# Patient Record
Sex: FEMALE | Race: WHITE | NOT HISPANIC OR LATINO | Employment: OTHER | URBAN - METROPOLITAN AREA
[De-identification: names, ages, dates, MRNs, and addresses within clinical notes are randomized per-mention and may not be internally consistent; named-entity substitution may affect disease eponyms.]

---

## 2017-02-15 ENCOUNTER — HISTORICAL (OUTPATIENT)
Dept: ADMINISTRATIVE | Facility: HOSPITAL | Age: 52
End: 2017-02-15

## 2017-05-16 ENCOUNTER — HISTORICAL (OUTPATIENT)
Dept: ADMINISTRATIVE | Facility: HOSPITAL | Age: 52
End: 2017-05-16

## 2017-05-16 LAB
ERYTHROCYTE [SEDIMENTATION RATE] IN BLOOD: 8 MM/HR (ref 0–20)
RHEUMATOID FACT SERPL-ACNC: <10 IU/ML (ref 0–15)
VIT B12 SERPL-MCNC: 335 PG/ML (ref 180–914)

## 2017-08-15 ENCOUNTER — HISTORICAL (OUTPATIENT)
Dept: INTERNAL MEDICINE | Facility: CLINIC | Age: 52
End: 2017-08-15

## 2017-08-15 LAB
EST. AVERAGE GLUCOSE BLD GHB EST-MCNC: 117 MG/DL
HBA1C MFR BLD: 5.7 % (ref 4.2–6.3)
HIV 1+2 AB+HIV1 P24 AG SERPL QL IA: NONREACTIVE
IRON SERPL-MCNC: 117 MCG/DL (ref 50–170)
VIT B12 SERPL-MCNC: 614 PG/ML (ref 193–986)

## 2017-09-19 ENCOUNTER — HISTORICAL (OUTPATIENT)
Dept: INTERNAL MEDICINE | Facility: CLINIC | Age: 52
End: 2017-09-19

## 2017-11-15 ENCOUNTER — HISTORICAL (OUTPATIENT)
Dept: INTERNAL MEDICINE | Facility: CLINIC | Age: 52
End: 2017-11-15

## 2017-12-11 ENCOUNTER — HISTORICAL (OUTPATIENT)
Dept: RADIOLOGY | Facility: HOSPITAL | Age: 52
End: 2017-12-11

## 2017-12-29 ENCOUNTER — HISTORICAL (OUTPATIENT)
Dept: LAB | Facility: HOSPITAL | Age: 52
End: 2017-12-29

## 2017-12-29 LAB
ALBUMIN SERPL-MCNC: 3.8 GM/DL (ref 3.4–5)
ALBUMIN/GLOB SERPL: 1 RATIO (ref 1–2)
ALP SERPL-CCNC: 80 UNIT/L (ref 45–117)
ALT SERPL-CCNC: 36 UNIT/L (ref 12–78)
AMPHET UR QL SCN: NEGATIVE
AST SERPL-CCNC: 29 UNIT/L (ref 15–37)
B-HCG SERPL QL: NEGATIVE
BARBITURATE SCN PRESENT UR: NEGATIVE
BENZODIAZ UR QL SCN: NEGATIVE
BILIRUB SERPL-MCNC: 0.6 MG/DL (ref 0.2–1)
BILIRUBIN DIRECT+TOT PNL SERPL-MCNC: 0.1 MG/DL
BILIRUBIN DIRECT+TOT PNL SERPL-MCNC: 0.5 MG/DL
BUN SERPL-MCNC: 13 MG/DL (ref 7–18)
CALCIUM SERPL-MCNC: 9.1 MG/DL (ref 8.5–10.1)
CANNABINOIDS UR QL SCN: NEGATIVE
CHLORIDE SERPL-SCNC: 106 MMOL/L (ref 98–107)
CHOLEST SERPL-MCNC: 181 MG/DL
CHOLEST/HDLC SERPL: 3 {RATIO} (ref 0–4.4)
CO2 SERPL-SCNC: 27 MMOL/L (ref 21–32)
COCAINE UR QL SCN: NEGATIVE
CREAT SERPL-MCNC: 0.7 MG/DL (ref 0.6–1.3)
ERYTHROCYTE [DISTWIDTH] IN BLOOD BY AUTOMATED COUNT: 12.6 % (ref 11.5–14.5)
FT4I SERPL CALC-MCNC: 2.41 (ref 2.6–3.6)
GLOBULIN SER-MCNC: 3.4 GM/ML (ref 2.3–3.5)
GLUCOSE SERPL-MCNC: 98 MG/DL (ref 74–106)
HCT VFR BLD AUTO: 35.7 % (ref 35–46)
HDLC SERPL-MCNC: 61 MG/DL
HGB BLD-MCNC: 11.9 GM/DL (ref 12–16)
LDLC SERPL CALC-MCNC: 86 MG/DL (ref 0–130)
MCH RBC QN AUTO: 29.8 PG (ref 26–34)
MCHC RBC AUTO-ENTMCNC: 33.3 GM/DL (ref 31–37)
MCV RBC AUTO: 89.5 FL (ref 80–100)
OPIATES UR QL SCN: NEGATIVE
PCP UR QL: NEGATIVE
PH UR STRIP.AUTO: 6 [PH] (ref 5–8)
PLATELET # BLD AUTO: 265 X10(3)/MCL (ref 130–400)
PMV BLD AUTO: 9.6 FL (ref 7.4–10.4)
POTASSIUM SERPL-SCNC: 4.3 MMOL/L (ref 3.5–5.1)
PROT SERPL-MCNC: 7.2 GM/DL (ref 6.4–8.2)
RBC # BLD AUTO: 3.99 X10(6)/MCL (ref 4–5.2)
SODIUM SERPL-SCNC: 142 MMOL/L (ref 136–145)
T3RU NFR SERPL: 33 % (ref 31–39)
T4 SERPL-MCNC: 7.3 MCG/DL (ref 4.7–13.3)
TEMPERATURE, URINE (OHS): 22.8 DEGC (ref 20–25)
TRIGL SERPL-MCNC: 168 MG/DL
TSH SERPL-ACNC: 1.58 MIU/L (ref 0.36–3.74)
VLDLC SERPL CALC-MCNC: 34 MG/DL
WBC # SPEC AUTO: 4.8 X10(3)/MCL (ref 4.5–11)

## 2018-03-02 ENCOUNTER — HISTORICAL (OUTPATIENT)
Dept: INTERNAL MEDICINE | Facility: CLINIC | Age: 53
End: 2018-03-02

## 2018-03-02 LAB
ABS NEUT (OLG): 3.73 X10(3)/MCL (ref 2.1–9.2)
ALBUMIN SERPL-MCNC: 4.1 GM/DL (ref 3.4–5)
ALBUMIN/GLOB SERPL: 1 RATIO (ref 1–2)
ALP SERPL-CCNC: 78 UNIT/L (ref 45–117)
ALT SERPL-CCNC: 17 UNIT/L (ref 12–78)
APPEARANCE, UA: ABNORMAL
AST SERPL-CCNC: 15 UNIT/L (ref 15–37)
BACTERIA #/AREA URNS AUTO: ABNORMAL /[HPF]
BASOPHILS # BLD AUTO: 0.03 X10(3)/MCL
BASOPHILS NFR BLD AUTO: 0 %
BILIRUB SERPL-MCNC: 1.2 MG/DL (ref 0.2–1)
BILIRUB UR QL STRIP: NEGATIVE
BILIRUBIN DIRECT+TOT PNL SERPL-MCNC: 0.3 MG/DL
BILIRUBIN DIRECT+TOT PNL SERPL-MCNC: 0.9 MG/DL
BUN SERPL-MCNC: 15 MG/DL (ref 7–18)
CALCIUM SERPL-MCNC: 9.1 MG/DL (ref 8.5–10.1)
CHLORIDE SERPL-SCNC: 105 MMOL/L (ref 98–107)
CHOLEST SERPL-MCNC: 185 MG/DL
CHOLEST/HDLC SERPL: 2.6 {RATIO} (ref 0–4.4)
CO2 SERPL-SCNC: 29 MMOL/L (ref 21–32)
COLOR UR: YELLOW
CREAT SERPL-MCNC: 0.7 MG/DL (ref 0.6–1.3)
DEPRECATED CALCIDIOL+CALCIFEROL SERPL-MC: 21.64 NG/ML (ref 30–80)
EOSINOPHIL # BLD AUTO: 0.22 X10(3)/MCL
EOSINOPHIL NFR BLD AUTO: 4 %
ERYTHROCYTE [DISTWIDTH] IN BLOOD BY AUTOMATED COUNT: 12.3 % (ref 11.5–14.5)
GLOBULIN SER-MCNC: 3.7 GM/ML (ref 2.3–3.5)
GLUCOSE (UA): NORMAL
GLUCOSE SERPL-MCNC: 95 MG/DL (ref 74–106)
HAV IGM SERPL QL IA: NONREACTIVE
HBV CORE IGM SERPL QL IA: NONREACTIVE
HBV SURFACE AG SERPL QL IA: NEGATIVE
HCT VFR BLD AUTO: 36.7 % (ref 35–46)
HCV AB SERPL QL IA: NONREACTIVE
HDLC SERPL-MCNC: 71 MG/DL
HGB BLD-MCNC: 12.6 GM/DL (ref 12–16)
HGB UR QL STRIP: 0.03 MG/DL
HIV 1+2 AB+HIV1 P24 AG SERPL QL IA: NONREACTIVE
HYALINE CASTS #/AREA URNS LPF: ABNORMAL /[LPF]
IMM GRANULOCYTES # BLD AUTO: 0.02 10*3/UL
IMM GRANULOCYTES NFR BLD AUTO: 0 %
KETONES UR QL STRIP: NEGATIVE
LDLC SERPL CALC-MCNC: 99 MG/DL (ref 0–130)
LEUKOCYTE ESTERASE UR QL STRIP: 250 LEU/UL
LYMPHOCYTES # BLD AUTO: 1.46 X10(3)/MCL
LYMPHOCYTES NFR BLD AUTO: 25 % (ref 13–40)
MCH RBC QN AUTO: 30 PG (ref 26–34)
MCHC RBC AUTO-ENTMCNC: 34.3 GM/DL (ref 31–37)
MCV RBC AUTO: 87.4 FL (ref 80–100)
MONOCYTES # BLD AUTO: 0.43 X10(3)/MCL
MONOCYTES NFR BLD AUTO: 7 % (ref 4–12)
NEUTROPHILS # BLD AUTO: 3.73 X10(3)/MCL
NEUTROPHILS NFR BLD AUTO: 63 X10(3)/MCL
NITRITE UR QL STRIP: ABNORMAL
PH UR STRIP: 7 [PH] (ref 4.5–8)
PLATELET # BLD AUTO: 234 X10(3)/MCL (ref 130–400)
PMV BLD AUTO: 9.4 FL (ref 7.4–10.4)
POTASSIUM SERPL-SCNC: 4.6 MMOL/L (ref 3.5–5.1)
PROT SERPL-MCNC: 7.8 GM/DL (ref 6.4–8.2)
PROT UR QL STRIP: 10 MG/DL
RBC # BLD AUTO: 4.2 X10(6)/MCL (ref 4–5.2)
RBC #/AREA URNS AUTO: ABNORMAL /[HPF]
SODIUM SERPL-SCNC: 140 MMOL/L (ref 136–145)
SP GR UR STRIP: 1.02 (ref 1–1.03)
SQUAMOUS #/AREA URNS LPF: >100 /[LPF]
TRIGL SERPL-MCNC: 73 MG/DL
TSH SERPL-ACNC: 0.94 MIU/L (ref 0.36–3.74)
UROBILINOGEN UR STRIP-ACNC: 2 MG/DL
VLDLC SERPL CALC-MCNC: 15 MG/DL
WBC # SPEC AUTO: 5.9 X10(3)/MCL (ref 4.5–11)
WBC #/AREA URNS AUTO: ABNORMAL /HPF

## 2018-07-24 ENCOUNTER — HISTORICAL (OUTPATIENT)
Dept: LAB | Facility: HOSPITAL | Age: 53
End: 2018-07-24

## 2018-07-24 LAB
ABS NEUT (OLG): 3.88 X10(3)/MCL (ref 2.1–9.2)
ALBUMIN SERPL-MCNC: 3.8 GM/DL (ref 3.4–5)
ALBUMIN/GLOB SERPL: 1 {RATIO}
ALP SERPL-CCNC: 79 UNIT/L (ref 45–117)
ALT SERPL-CCNC: 19 UNIT/L (ref 13–56)
AST SERPL-CCNC: 18 UNIT/L (ref 15–37)
BASOPHILS # BLD AUTO: 0.03 X10(3)/MCL (ref 0–0.2)
BASOPHILS NFR BLD AUTO: 0.5 % (ref 0–1)
BILIRUB SERPL-MCNC: 0.7 MG/DL (ref 0.2–1)
BILIRUBIN DIRECT+TOT PNL SERPL-MCNC: 0.2 MG/DL (ref 0–0.2)
BILIRUBIN DIRECT+TOT PNL SERPL-MCNC: 0.5 MG/DL (ref 0–1)
BUN SERPL-MCNC: 13 MG/DL (ref 7–18)
CALCIUM SERPL-MCNC: 8.7 MG/DL (ref 8.5–10.1)
CHLORIDE SERPL-SCNC: 106 MMOL/L (ref 98–107)
CHOLEST SERPL-MCNC: 165 MG/DL (ref 0–200)
CHOLEST/HDLC SERPL: 2.4 {RATIO} (ref 0–4)
CO2 SERPL-SCNC: 26 MMOL/L (ref 21–32)
CREAT SERPL-MCNC: 0.75 MG/DL (ref 0.55–1.02)
DEPRECATED CALCIDIOL+CALCIFEROL SERPL-MC: 29 NG/ML (ref 30–80)
EOSINOPHIL # BLD AUTO: 0.29 X10(3)/MCL (ref 0–0.9)
EOSINOPHIL NFR BLD AUTO: 4.8 % (ref 0–6.4)
ERYTHROCYTE [DISTWIDTH] IN BLOOD BY AUTOMATED COUNT: 13.4 % (ref 11.5–17)
EST. AVERAGE GLUCOSE BLD GHB EST-MCNC: 117 MG/DL
FT4I SERPL CALC-MCNC: 2.51 (ref 2.6–3.6)
GLOBULIN SER-MCNC: 3 GM/DL (ref 2–4)
GLUCOSE SERPL-MCNC: 95 MG/DL (ref 74–106)
HBA1C MFR BLD: 5.7 % (ref 4.2–6.3)
HCT VFR BLD AUTO: 33.1 % (ref 37–47)
HDLC SERPL-MCNC: 68 MG/DL (ref 35–60)
HGB BLD-MCNC: 11.1 GM/DL (ref 12–16)
IMM GRANULOCYTES # BLD AUTO: 0.02 10*3/UL (ref 0–0.02)
IMM GRANULOCYTES NFR BLD AUTO: 0.3 % (ref 0–0.43)
LDLC SERPL CALC-MCNC: 87 MG/DL (ref 0–129)
LYMPHOCYTES # BLD AUTO: 1.33 X10(3)/MCL (ref 0.6–4.6)
LYMPHOCYTES NFR BLD AUTO: 22.2 % (ref 16–44)
MCH RBC QN AUTO: 29.4 PG (ref 27–31)
MCHC RBC AUTO-ENTMCNC: 33.5 GM/DL (ref 33–36)
MCV RBC AUTO: 87.6 FL (ref 80–94)
MONOCYTES # BLD AUTO: 0.44 X10(3)/MCL (ref 0.1–1.3)
MONOCYTES NFR BLD AUTO: 7.3 % (ref 4–12.1)
NEUTROPHILS # BLD AUTO: 3.88 X10(3)/MCL (ref 2.1–9.2)
NEUTROPHILS NFR BLD AUTO: 64.9 % (ref 43–73)
NRBC BLD AUTO-RTO: 0 % (ref 0–0.2)
PLATELET # BLD AUTO: 233 X10(3)/MCL (ref 130–400)
PMV BLD AUTO: 9 FL (ref 7.4–10.4)
POTASSIUM SERPL-SCNC: 3.8 MMOL/L (ref 3.5–5.1)
PROT SERPL-MCNC: 7.2 GM/DL (ref 6.4–8.2)
RBC # BLD AUTO: 3.78 X10(6)/MCL (ref 4.2–5.4)
SODIUM SERPL-SCNC: 142 MMOL/L (ref 136–145)
T3RU NFR SERPL: 33 % (ref 31–39)
T4 SERPL-MCNC: 7.6 MCG/DL (ref 4.7–13.3)
TRIGL SERPL-MCNC: 49 MG/DL (ref 30–150)
TSH SERPL-ACNC: 1.31 MIU/ML (ref 0.36–3.74)
VLDLC SERPL CALC-MCNC: 10 MG/DL
WBC # SPEC AUTO: 6 X10(3)/MCL (ref 4.5–11.5)

## 2018-09-25 ENCOUNTER — HISTORICAL (OUTPATIENT)
Dept: ENDOSCOPY | Facility: HOSPITAL | Age: 53
End: 2018-09-25

## 2018-10-17 ENCOUNTER — HISTORICAL (OUTPATIENT)
Dept: GASTROENTEROLOGY | Facility: CLINIC | Age: 53
End: 2018-10-17

## 2018-10-20 LAB
COLOR STL: NORMAL
CONSISTENCY STL: NORMAL
HEMOCCULT SP1 STL QL: NEGATIVE
HEMOCCULT SP2 STL QL: NEGATIVE

## 2018-10-23 ENCOUNTER — HISTORICAL (OUTPATIENT)
Dept: WOUND CARE | Facility: HOSPITAL | Age: 53
End: 2018-10-23

## 2019-01-31 ENCOUNTER — HISTORICAL (OUTPATIENT)
Dept: ADMINISTRATIVE | Facility: HOSPITAL | Age: 54
End: 2019-01-31

## 2019-01-31 ENCOUNTER — HISTORICAL (OUTPATIENT)
Dept: INTERNAL MEDICINE | Facility: CLINIC | Age: 54
End: 2019-01-31

## 2019-01-31 LAB
CRP SERPL-MCNC: 0.5 MG/DL
ERYTHROCYTE [SEDIMENTATION RATE] IN BLOOD: 11 MM/HR (ref 0–20)

## 2019-02-21 ENCOUNTER — HISTORICAL (OUTPATIENT)
Dept: RADIOLOGY | Facility: HOSPITAL | Age: 54
End: 2019-02-21

## 2019-02-22 ENCOUNTER — HISTORICAL (OUTPATIENT)
Dept: ADMINISTRATIVE | Facility: HOSPITAL | Age: 54
End: 2019-02-22

## 2019-02-22 LAB
T4 FREE SERPL-MCNC: 0.89 NG/DL (ref 0.76–1.46)
T4 SERPL-MCNC: 8.7 MCG/DL (ref 4.8–13.9)
TSH SERPL-ACNC: 5.34 MIU/L (ref 0.36–3.74)

## 2019-04-05 ENCOUNTER — HISTORICAL (OUTPATIENT)
Dept: ADMINISTRATIVE | Facility: HOSPITAL | Age: 54
End: 2019-04-05

## 2019-04-22 ENCOUNTER — HISTORICAL (OUTPATIENT)
Dept: RADIOLOGY | Facility: HOSPITAL | Age: 54
End: 2019-04-22

## 2019-05-28 ENCOUNTER — HISTORICAL (OUTPATIENT)
Dept: LAB | Facility: HOSPITAL | Age: 54
End: 2019-05-28

## 2019-05-28 LAB
ALBUMIN SERPL-MCNC: 3.6 GM/DL (ref 3.4–5)
ALBUMIN/GLOB SERPL: 0.9 RATIO (ref 1.1–2)
ALP SERPL-CCNC: 88 UNIT/L (ref 45–117)
ALT SERPL-CCNC: 31 UNIT/L (ref 12–78)
AMPHET UR QL SCN: NEGATIVE
AST SERPL-CCNC: 20 UNIT/L (ref 15–37)
BARBITURATE SCN PRESENT UR: NEGATIVE
BENZODIAZ UR QL SCN: POSITIVE
BILIRUB SERPL-MCNC: 0.4 MG/DL (ref 0.2–1)
BILIRUBIN DIRECT+TOT PNL SERPL-MCNC: 0.1 MG/DL
BILIRUBIN DIRECT+TOT PNL SERPL-MCNC: 0.3 MG/DL
BUN SERPL-MCNC: 19 MG/DL (ref 7–18)
CALCIUM SERPL-MCNC: 9.5 MG/DL (ref 8.5–10.1)
CANNABINOIDS UR QL SCN: NEGATIVE
CHLORIDE SERPL-SCNC: 105 MMOL/L (ref 98–107)
CHOLEST SERPL-MCNC: 177 MG/DL
CHOLEST/HDLC SERPL: 2.7 {RATIO} (ref 0–4.4)
CO2 SERPL-SCNC: 27 MMOL/L (ref 21–32)
COCAINE UR QL SCN: NEGATIVE
CREAT SERPL-MCNC: 0.9 MG/DL (ref 0.6–1.3)
DEPRECATED CALCIDIOL+CALCIFEROL SERPL-MC: 54.49 NG/ML (ref 30–80)
ERYTHROCYTE [DISTWIDTH] IN BLOOD BY AUTOMATED COUNT: 14.2 % (ref 11.5–14.5)
EST. AVERAGE GLUCOSE BLD GHB EST-MCNC: 131 MG/DL
GLOBULIN SER-MCNC: 3.9 GM/ML (ref 2.3–3.5)
GLUCOSE SERPL-MCNC: 104 MG/DL (ref 74–106)
HBA1C MFR BLD: 6.2 % (ref 4.2–6.3)
HCT VFR BLD AUTO: 34.3 % (ref 35–46)
HDLC SERPL-MCNC: 65 MG/DL
HGB BLD-MCNC: 11.5 GM/DL (ref 12–16)
LDLC SERPL CALC-MCNC: 99 MG/DL (ref 0–130)
MCH RBC QN AUTO: 28 PG (ref 26–34)
MCHC RBC AUTO-ENTMCNC: 33.5 GM/DL (ref 31–37)
MCV RBC AUTO: 83.5 FL (ref 80–100)
OPIATES UR QL SCN: NEGATIVE
PCP UR QL: NEGATIVE
PH UR STRIP.AUTO: 8 [PH] (ref 5–8)
PLATELET # BLD AUTO: 293 X10(3)/MCL (ref 130–400)
PMV BLD AUTO: 9.2 FL (ref 7.4–10.4)
POTASSIUM SERPL-SCNC: 4.4 MMOL/L (ref 3.5–5.1)
PROT SERPL-MCNC: 7.5 GM/DL (ref 6.4–8.2)
RBC # BLD AUTO: 4.11 X10(6)/MCL (ref 4–5.2)
SODIUM SERPL-SCNC: 137 MMOL/L (ref 136–145)
T3RU NFR SERPL: 35 % (ref 31–39)
T4 FREE SERPL-MCNC: 0.98 NG/DL (ref 0.76–1.46)
TEMPERATURE, URINE (OHS): 21.7 DEGC (ref 20–25)
TRIGL SERPL-MCNC: 66 MG/DL
TSH SERPL-ACNC: 1.6 MIU/L (ref 0.36–3.74)
VLDLC SERPL CALC-MCNC: 13 MG/DL
WBC # SPEC AUTO: 5.9 X10(3)/MCL (ref 4.5–11)

## 2019-06-25 ENCOUNTER — HISTORICAL (OUTPATIENT)
Dept: INTERNAL MEDICINE | Facility: CLINIC | Age: 54
End: 2019-06-25

## 2019-06-25 LAB
CRP SERPL-MCNC: 0.4 MG/DL
ERYTHROCYTE [SEDIMENTATION RATE] IN BLOOD: 13 MM/HR (ref 0–20)

## 2019-11-06 ENCOUNTER — HISTORICAL (OUTPATIENT)
Dept: INTERNAL MEDICINE | Facility: CLINIC | Age: 54
End: 2019-11-06

## 2020-03-04 ENCOUNTER — HISTORICAL (OUTPATIENT)
Dept: ADMINISTRATIVE | Facility: HOSPITAL | Age: 55
End: 2020-03-04

## 2020-03-04 LAB
BUN SERPL-MCNC: 14 MG/DL (ref 7–18)
CALCIUM SERPL-MCNC: 9 MG/DL (ref 8.5–10.1)
CHLORIDE SERPL-SCNC: 107 MMOL/L (ref 98–107)
CO2 SERPL-SCNC: 28 MMOL/L (ref 21–32)
CREAT SERPL-MCNC: 0.8 MG/DL (ref 0.6–1.3)
CREAT/UREA NIT SERPL: 18
EST. AVERAGE GLUCOSE BLD GHB EST-MCNC: 131 MG/DL
GLUCOSE SERPL-MCNC: 104 MG/DL (ref 74–106)
HBA1C MFR BLD: 6.2 % (ref 4.2–6.3)
POTASSIUM SERPL-SCNC: 4.5 MMOL/L (ref 3.5–5.1)
SODIUM SERPL-SCNC: 138 MMOL/L (ref 136–145)

## 2020-06-16 ENCOUNTER — HISTORICAL (OUTPATIENT)
Dept: RADIOLOGY | Facility: HOSPITAL | Age: 55
End: 2020-06-16

## 2020-07-01 ENCOUNTER — HISTORICAL (OUTPATIENT)
Dept: LAB | Facility: HOSPITAL | Age: 55
End: 2020-07-01

## 2020-07-01 LAB
ALBUMIN SERPL-MCNC: 4.1 GM/DL (ref 3.4–5)
ALBUMIN/GLOB SERPL: 1.1 RATIO (ref 1.1–2)
ALP SERPL-CCNC: 74 UNIT/L (ref 45–117)
ALT SERPL-CCNC: 25 UNIT/L (ref 12–78)
AMPHET UR QL SCN: POSITIVE
AST SERPL-CCNC: 15 UNIT/L (ref 15–37)
BARBITURATE SCN PRESENT UR: NEGATIVE
BENZODIAZ UR QL SCN: NEGATIVE
BILIRUB SERPL-MCNC: 0.6 MG/DL (ref 0.2–1)
BILIRUBIN DIRECT+TOT PNL SERPL-MCNC: 0.2 MG/DL (ref 0–0.2)
BILIRUBIN DIRECT+TOT PNL SERPL-MCNC: 0.4 MG/DL
BUN SERPL-MCNC: 15 MG/DL (ref 7–18)
CALCIUM SERPL-MCNC: 9.2 MG/DL (ref 8.5–10.1)
CANNABINOIDS UR QL SCN: POSITIVE
CHLORIDE SERPL-SCNC: 107 MMOL/L (ref 98–107)
CHOLEST SERPL-MCNC: 209 MG/DL
CHOLEST/HDLC SERPL: 4.5 {RATIO} (ref 0–4.4)
CO2 SERPL-SCNC: 28 MMOL/L (ref 21–32)
COCAINE UR QL SCN: NEGATIVE
CREAT SERPL-MCNC: 0.9 MG/DL (ref 0.6–1.3)
DEPRECATED CALCIDIOL+CALCIFEROL SERPL-MC: 64.9 NG/ML (ref 30–80)
ERYTHROCYTE [DISTWIDTH] IN BLOOD BY AUTOMATED COUNT: 12.7 % (ref 11.5–14.5)
EST. AVERAGE GLUCOSE BLD GHB EST-MCNC: 146 MG/DL
GLOBULIN SER-MCNC: 3.7 GM/ML (ref 2.3–3.5)
GLUCOSE SERPL-MCNC: 103 MG/DL (ref 74–106)
HBA1C MFR BLD: 6.7 % (ref 4.2–6.3)
HCT VFR BLD AUTO: 36.9 % (ref 35–46)
HDLC SERPL-MCNC: 46 MG/DL (ref 40–59)
HGB BLD-MCNC: 12.4 GM/DL (ref 12–16)
LDLC SERPL CALC-MCNC: 132 MG/DL
MCH RBC QN AUTO: 29 PG (ref 26–34)
MCHC RBC AUTO-ENTMCNC: 33.6 GM/DL (ref 31–37)
MCV RBC AUTO: 86.4 FL (ref 80–100)
OPIATES UR QL SCN: POSITIVE
PCP UR QL: NEGATIVE
PH UR STRIP.AUTO: 6.5 [PH] (ref 5–8)
PLATELET # BLD AUTO: 276 X10(3)/MCL (ref 130–400)
PMV BLD AUTO: 9.3 FL (ref 7.4–10.4)
POTASSIUM SERPL-SCNC: 3.4 MMOL/L (ref 3.5–5.1)
PROLACTIN LEVEL (OHS): 21.3 NG/ML (ref 1–24)
PROT SERPL-MCNC: 7.8 GM/DL (ref 6.4–8.2)
RBC # BLD AUTO: 4.27 X10(6)/MCL (ref 4–5.2)
SODIUM SERPL-SCNC: 142 MMOL/L (ref 136–145)
T3RU NFR SERPL: 31.03 % (ref 31–39)
T4 FREE SERPL-MCNC: 0.99 NG/DL (ref 0.76–1.46)
TEMPERATURE, URINE (OHS): 23.6 DEGC (ref 20–25)
TRIGL SERPL-MCNC: 153 MG/DL
TSH SERPL-ACNC: 1.11 MIU/L (ref 0.36–3.74)
VLDLC SERPL CALC-MCNC: 31 MG/DL
WBC # SPEC AUTO: 7.2 X10(3)/MCL (ref 4.5–11)

## 2021-02-25 ENCOUNTER — HISTORICAL (OUTPATIENT)
Dept: ADMINISTRATIVE | Facility: HOSPITAL | Age: 56
End: 2021-02-25

## 2021-02-25 LAB
ABS NEUT (OLG): 2.85 X10(3)/MCL (ref 2.1–9.2)
ALBUMIN SERPL-MCNC: 4.3 GM/DL (ref 3.5–5)
ALBUMIN/GLOB SERPL: 1.4 RATIO (ref 1.1–2)
ALP SERPL-CCNC: 71 UNIT/L (ref 40–150)
ALT SERPL-CCNC: 28 UNIT/L (ref 0–55)
AST SERPL-CCNC: 24 UNIT/L (ref 5–34)
BASOPHILS # BLD AUTO: 0 X10(3)/MCL (ref 0–0.2)
BASOPHILS NFR BLD AUTO: 1 %
BILIRUB SERPL-MCNC: 0.9 MG/DL
BILIRUBIN DIRECT+TOT PNL SERPL-MCNC: 0.3 MG/DL (ref 0–0.5)
BILIRUBIN DIRECT+TOT PNL SERPL-MCNC: 0.6 MG/DL (ref 0–0.8)
BUN SERPL-MCNC: 16.1 MG/DL (ref 9.8–20.1)
CALCIUM SERPL-MCNC: 9.3 MG/DL (ref 8.4–10.2)
CHLORIDE SERPL-SCNC: 102 MMOL/L (ref 98–107)
CO2 SERPL-SCNC: 28 MMOL/L (ref 22–29)
CREAT SERPL-MCNC: 0.84 MG/DL (ref 0.55–1.02)
CREAT UR-MCNC: 238.6 MG/DL (ref 45–106)
CREAT UR-MCNC: 238.6 MG/DL (ref 45–106)
DEPRECATED CALCIDIOL+CALCIFEROL SERPL-MC: 60.6 NG/ML (ref 30–80)
EOSINOPHIL # BLD AUTO: 0.2 X10(3)/MCL (ref 0–0.9)
EOSINOPHIL NFR BLD AUTO: 5 %
ERYTHROCYTE [DISTWIDTH] IN BLOOD BY AUTOMATED COUNT: 12.3 % (ref 11.5–14.5)
EST. AVERAGE GLUCOSE BLD GHB EST-MCNC: 102.5 MG/DL
GLOBULIN SER-MCNC: 3.1 GM/DL (ref 2.4–3.5)
GLUCOSE SERPL-MCNC: 97 MG/DL (ref 74–100)
HAV IGM SERPL QL IA: NONREACTIVE
HBA1C MFR BLD: 5.2 %
HBV CORE IGM SERPL QL IA: NONREACTIVE
HBV SURFACE AG SERPL QL IA: NONREACTIVE
HCT VFR BLD AUTO: 38.1 % (ref 35–46)
HCV AB SERPL QL IA: NONREACTIVE
HGB BLD-MCNC: 12.5 GM/DL (ref 12–16)
HIV 1+2 AB+HIV1 P24 AG SERPL QL IA: NONREACTIVE
IMM GRANULOCYTES # BLD AUTO: 0.01 10*3/UL
IMM GRANULOCYTES NFR BLD AUTO: 0 %
LYMPHOCYTES # BLD AUTO: 1.3 X10(3)/MCL (ref 0.6–4.6)
LYMPHOCYTES NFR BLD AUTO: 27 %
MCH RBC QN AUTO: 29.8 PG (ref 26–34)
MCHC RBC AUTO-ENTMCNC: 32.8 GM/DL (ref 31–37)
MCV RBC AUTO: 90.9 FL (ref 80–100)
MICROALBUMIN UR-MCNC: 14.5 MG/L
MICROALBUMIN/CREAT RATIO PNL UR: 6.1 MG/GM CR (ref 0–30)
MONOCYTES # BLD AUTO: 0.4 X10(3)/MCL (ref 0.1–1.3)
MONOCYTES NFR BLD AUTO: 8 %
NEUTROPHILS # BLD AUTO: 2.85 X10(3)/MCL (ref 2.1–9.2)
NEUTROPHILS NFR BLD AUTO: 58 %
PLATELET # BLD AUTO: 269 X10(3)/MCL (ref 130–400)
PMV BLD AUTO: 9 FL (ref 7.4–10.4)
POTASSIUM SERPL-SCNC: 4.1 MMOL/L (ref 3.5–5.1)
PROT SERPL-MCNC: 7.4 GM/DL (ref 6.4–8.3)
PROT UR STRIP-MCNC: 13.1 MG/DL
PROT/CREAT UR-RTO: 54.9 MG/GM CR
RBC # BLD AUTO: 4.19 X10(6)/MCL (ref 4–5.2)
SODIUM SERPL-SCNC: 137 MMOL/L (ref 136–145)
T PALLIDUM AB SER QL: NONREACTIVE
T3FREE SERPL-MCNC: 1.83 PG/ML (ref 1.71–3.71)
T4 FREE SERPL-MCNC: 1.04 NG/DL (ref 0.7–1.48)
TSH SERPL-ACNC: 3.32 UIU/ML (ref 0.35–4.94)
WBC # SPEC AUTO: 4.9 X10(3)/MCL (ref 4.5–11)

## 2021-03-23 ENCOUNTER — HISTORICAL (OUTPATIENT)
Dept: RADIOLOGY | Facility: HOSPITAL | Age: 56
End: 2021-03-23

## 2021-05-27 ENCOUNTER — HISTORICAL (OUTPATIENT)
Dept: LAB | Facility: HOSPITAL | Age: 56
End: 2021-05-27

## 2021-05-27 LAB
ALBUMIN SERPL-MCNC: 4.1 GM/DL (ref 3.5–5)
ALBUMIN/GLOB SERPL: 1.3 RATIO (ref 1.1–2)
ALP SERPL-CCNC: 59 UNIT/L (ref 40–150)
ALT SERPL-CCNC: 17 UNIT/L (ref 0–55)
AMPHET UR QL SCN: NEGATIVE
AST SERPL-CCNC: 31 UNIT/L (ref 5–34)
BARBITURATE SCN PRESENT UR: NEGATIVE
BENZODIAZ UR QL SCN: POSITIVE
BILIRUB SERPL-MCNC: 0.8 MG/DL
BILIRUBIN DIRECT+TOT PNL SERPL-MCNC: 0.3 MG/DL (ref 0–0.5)
BILIRUBIN DIRECT+TOT PNL SERPL-MCNC: 0.5 MG/DL (ref 0–0.8)
BUN SERPL-MCNC: 17.7 MG/DL (ref 9.8–20.1)
CALCIUM SERPL-MCNC: 9.7 MG/DL (ref 8.4–10.2)
CANNABINOIDS UR QL SCN: NEGATIVE
CHLORIDE SERPL-SCNC: 106 MMOL/L (ref 98–107)
CHOLEST SERPL-MCNC: 184 MG/DL
CHOLEST/HDLC SERPL: 4 {RATIO} (ref 0–5)
CO2 SERPL-SCNC: 25 MMOL/L (ref 22–29)
COCAINE UR QL SCN: NEGATIVE
CREAT SERPL-MCNC: 0.88 MG/DL (ref 0.55–1.02)
DEPRECATED CALCIDIOL+CALCIFEROL SERPL-MC: 59.2 NG/ML (ref 30–80)
ERYTHROCYTE [DISTWIDTH] IN BLOOD BY AUTOMATED COUNT: 12.7 % (ref 11.5–14.5)
GLOBULIN SER-MCNC: 3.1 GM/DL (ref 2.4–3.5)
GLUCOSE SERPL-MCNC: 143 MG/DL (ref 74–100)
HCT VFR BLD AUTO: 36.7 % (ref 35–46)
HDLC SERPL-MCNC: 47 MG/DL (ref 35–60)
HGB BLD-MCNC: 12.5 GM/DL (ref 12–16)
LDLC SERPL CALC-MCNC: 108 MG/DL (ref 50–140)
MCH RBC QN AUTO: 30.8 PG (ref 26–34)
MCHC RBC AUTO-ENTMCNC: 34.1 GM/DL (ref 31–37)
MCV RBC AUTO: 90.4 FL (ref 80–100)
NRBC BLD AUTO-RTO: 0 % (ref 0–0.2)
OPIATES UR QL SCN: POSITIVE
PCP UR QL: NEGATIVE
PH UR STRIP.AUTO: 7 [PH] (ref 3–11)
PLATELET # BLD AUTO: 275 X10(3)/MCL (ref 130–400)
PMV BLD AUTO: 9.6 FL (ref 7.4–10.4)
POTASSIUM SERPL-SCNC: 4.7 MMOL/L (ref 3.5–5.1)
PROLACTIN LEVEL (OHS): 54.04 NG/ML (ref 5.18–26.53)
PROT SERPL-MCNC: 7.2 GM/DL (ref 6.4–8.3)
RBC # BLD AUTO: 4.06 X10(6)/MCL (ref 4–5.2)
SODIUM SERPL-SCNC: 140 MMOL/L (ref 136–145)
TRIGL SERPL-MCNC: 145 MG/DL (ref 37–140)
VLDLC SERPL CALC-MCNC: 29 MG/DL
WBC # SPEC AUTO: 5.3 X10(3)/MCL (ref 4.5–11)

## 2021-06-18 ENCOUNTER — HISTORICAL (OUTPATIENT)
Dept: RADIOLOGY | Facility: HOSPITAL | Age: 56
End: 2021-06-18

## 2021-07-01 ENCOUNTER — PATIENT MESSAGE (OUTPATIENT)
Dept: ADMINISTRATIVE | Facility: OTHER | Age: 56
End: 2021-07-01

## 2021-12-23 ENCOUNTER — HISTORICAL (OUTPATIENT)
Dept: INTERNAL MEDICINE | Facility: CLINIC | Age: 56
End: 2021-12-23

## 2021-12-23 LAB
ABS NEUT (OLG): 5.23 X10(3)/MCL (ref 2.1–9.2)
ALBUMIN SERPL-MCNC: 4.2 GM/DL (ref 3.5–5)
ALBUMIN/GLOB SERPL: 1.2 RATIO (ref 1.1–2)
ALP SERPL-CCNC: 66 UNIT/L (ref 40–150)
ALT SERPL-CCNC: 17 UNIT/L (ref 0–55)
AST SERPL-CCNC: 15 UNIT/L (ref 5–34)
BASOPHILS # BLD AUTO: 0 X10(3)/MCL (ref 0–0.2)
BASOPHILS NFR BLD AUTO: 1 %
BILIRUB SERPL-MCNC: 0.8 MG/DL
BILIRUBIN DIRECT+TOT PNL SERPL-MCNC: 0.3 MG/DL (ref 0–0.5)
BILIRUBIN DIRECT+TOT PNL SERPL-MCNC: 0.5 MG/DL (ref 0–0.8)
BUN SERPL-MCNC: 12.7 MG/DL (ref 9.8–20.1)
CALCIUM SERPL-MCNC: 9.4 MG/DL (ref 8.7–10.5)
CHLORIDE SERPL-SCNC: 106 MMOL/L (ref 98–107)
CO2 SERPL-SCNC: 24 MMOL/L (ref 22–29)
CREAT SERPL-MCNC: 0.78 MG/DL (ref 0.55–1.02)
EOSINOPHIL # BLD AUTO: 0.1 X10(3)/MCL (ref 0–0.9)
EOSINOPHIL NFR BLD AUTO: 1 %
ERYTHROCYTE [DISTWIDTH] IN BLOOD BY AUTOMATED COUNT: 11.9 % (ref 11.5–14.5)
EST. AVERAGE GLUCOSE BLD GHB EST-MCNC: 111.2 MG/DL
GLOBULIN SER-MCNC: 3.4 GM/DL (ref 2.4–3.5)
GLUCOSE SERPL-MCNC: 199 MG/DL (ref 74–100)
HBA1C MFR BLD: 5.5 %
HCT VFR BLD AUTO: 40.3 % (ref 35–46)
HGB BLD-MCNC: 13.9 GM/DL (ref 12–16)
IMM GRANULOCYTES # BLD AUTO: 0.04 10*3/UL
IMM GRANULOCYTES NFR BLD AUTO: 0 %
LYMPHOCYTES # BLD AUTO: 2 X10(3)/MCL (ref 0.6–4.6)
LYMPHOCYTES NFR BLD AUTO: 25 %
MCH RBC QN AUTO: 30.8 PG (ref 26–34)
MCHC RBC AUTO-ENTMCNC: 34.5 GM/DL (ref 31–37)
MCV RBC AUTO: 89.2 FL (ref 80–100)
MONOCYTES # BLD AUTO: 0.6 X10(3)/MCL (ref 0.1–1.3)
MONOCYTES NFR BLD AUTO: 7 %
NEUTROPHILS # BLD AUTO: 5.23 X10(3)/MCL (ref 2.1–9.2)
NEUTROPHILS NFR BLD AUTO: 65 %
NRBC BLD AUTO-RTO: 0 % (ref 0–0.2)
PLATELET # BLD AUTO: 298 X10(3)/MCL (ref 130–400)
PMV BLD AUTO: 9.2 FL (ref 7.4–10.4)
POTASSIUM SERPL-SCNC: 4 MMOL/L (ref 3.5–5.1)
PROT SERPL-MCNC: 7.6 GM/DL (ref 6.4–8.3)
RBC # BLD AUTO: 4.52 X10(6)/MCL (ref 4–5.2)
SODIUM SERPL-SCNC: 138 MMOL/L (ref 136–145)
WBC # SPEC AUTO: 8 X10(3)/MCL (ref 4.5–11)

## 2021-12-27 ENCOUNTER — HISTORICAL (OUTPATIENT)
Dept: ADMINISTRATIVE | Facility: HOSPITAL | Age: 56
End: 2021-12-27

## 2021-12-27 ENCOUNTER — HISTORICAL (OUTPATIENT)
Dept: LAB | Facility: HOSPITAL | Age: 56
End: 2021-12-27

## 2021-12-27 LAB
ALBUMIN SERPL-MCNC: 4.1 GM/DL (ref 3.5–5)
ALBUMIN/GLOB SERPL: 1.2 RATIO (ref 1.1–2)
ALP SERPL-CCNC: 70 UNIT/L (ref 40–150)
ALT SERPL-CCNC: 23 UNIT/L (ref 0–55)
APPEARANCE, UA: ABNORMAL
AST SERPL-CCNC: 16 UNIT/L (ref 5–34)
BACTERIA SPEC CULT: ABNORMAL
BILIRUB SERPL-MCNC: 0.5 MG/DL
BILIRUB UR QL STRIP: NEGATIVE
BILIRUBIN DIRECT+TOT PNL SERPL-MCNC: 0.2 MG/DL (ref 0–0.5)
BILIRUBIN DIRECT+TOT PNL SERPL-MCNC: 0.3 MG/DL (ref 0–0.8)
BUN SERPL-MCNC: 16.6 MG/DL (ref 9.8–20.1)
CALCIUM SERPL-MCNC: 9.7 MG/DL (ref 8.7–10.5)
CHLORIDE SERPL-SCNC: 102 MMOL/L (ref 98–107)
CHOLEST SERPL-MCNC: 173 MG/DL
CHOLEST/HDLC SERPL: 3 {RATIO} (ref 0–5)
CO2 SERPL-SCNC: 29 MMOL/L (ref 22–29)
COLOR UR: ABNORMAL
CREAT SERPL-MCNC: 0.84 MG/DL (ref 0.55–1.02)
DEPRECATED CALCIDIOL+CALCIFEROL SERPL-MC: 53.9 NG/ML (ref 30–80)
ERYTHROCYTE [DISTWIDTH] IN BLOOD BY AUTOMATED COUNT: 12.2 % (ref 11.5–14.5)
EST. AVERAGE GLUCOSE BLD GHB EST-MCNC: 114 MG/DL
GLOBULIN SER-MCNC: 3.4 GM/DL (ref 2.4–3.5)
GLUCOSE (UA): NORMAL /UL
GLUCOSE SERPL-MCNC: 118 MG/DL (ref 74–100)
HBA1C MFR BLD: 5.6 %
HCT VFR BLD AUTO: 40.5 % (ref 35–46)
HDLC SERPL-MCNC: 51 MG/DL (ref 35–60)
HGB BLD-MCNC: 13.6 GM/DL (ref 12–16)
HGB UR QL STRIP: ABNORMAL /HPF
HYALINE CASTS #/AREA URNS LPF: ABNORMAL /LPF
KETONES UR QL STRIP: ABNORMAL /UL
LDLC SERPL CALC-MCNC: 92 MG/DL (ref 50–140)
LEUKOCYTE ESTERASE UR QL STRIP: 500
MCH RBC QN AUTO: 31.2 PG (ref 26–34)
MCHC RBC AUTO-ENTMCNC: 33.6 GM/DL (ref 31–37)
MCV RBC AUTO: 92.9 FL (ref 80–100)
MUCOUS THREADS URNS QL MICRO: ABNORMAL /LPF
NITRITE UR QL STRIP: NEGATIVE
NRBC BLD AUTO-RTO: 0 % (ref 0–0.2)
PH UR STRIP: 5.5 /UL (ref 4.5–8)
PLATELET # BLD AUTO: 275 X10(3)/MCL (ref 130–400)
PMV BLD AUTO: 9.3 FL (ref 7.4–10.4)
POTASSIUM SERPL-SCNC: 3.9 MMOL/L (ref 3.5–5.1)
PROLACTIN LEVEL (OHS): 78.9 NG/ML (ref 5.18–26.53)
PROT SERPL-MCNC: 7.5 GM/DL (ref 6.4–8.3)
PROT UR QL STRIP: ABNORMAL /UL
RBC # BLD AUTO: 4.36 X10(6)/MCL (ref 4–5.2)
RBC #/AREA URNS HPF: ABNORMAL /HPF
SODIUM SERPL-SCNC: 139 MMOL/L (ref 136–145)
SP GR UR STRIP: 1.03 (ref 1–1.03)
SQUAMOUS EPITHELIAL, UA: ABNORMAL /LPF
T3RU NFR SERPL: 38.39 % (ref 31–39)
T4 FREE SERPL-MCNC: 0.89 NG/DL (ref 0.7–1.48)
TRIGL SERPL-MCNC: 149 MG/DL (ref 37–140)
TSH SERPL-ACNC: 1.55 UIU/ML (ref 0.35–4.94)
UROBILINOGEN UR STRIP-ACNC: ABNORMAL /HPF
VLDLC SERPL CALC-MCNC: 30 MG/DL
WBC # SPEC AUTO: 8.2 X10(3)/MCL (ref 4.5–11)
WBC #/AREA URNS HPF: >100 /HPF

## 2022-04-11 ENCOUNTER — HISTORICAL (OUTPATIENT)
Dept: ADMINISTRATIVE | Facility: HOSPITAL | Age: 57
End: 2022-04-11
Payer: MEDICAID

## 2022-04-18 ENCOUNTER — HISTORICAL (OUTPATIENT)
Dept: ADMINISTRATIVE | Facility: HOSPITAL | Age: 57
End: 2022-04-18
Payer: MEDICAID

## 2022-04-18 ENCOUNTER — HISTORICAL (OUTPATIENT)
Dept: RADIOLOGY | Facility: HOSPITAL | Age: 57
End: 2022-04-18
Payer: MEDICAID

## 2022-04-28 ENCOUNTER — HISTORICAL (OUTPATIENT)
Dept: RADIOLOGY | Facility: HOSPITAL | Age: 57
End: 2022-04-28
Payer: MEDICAID

## 2022-04-28 VITALS
SYSTOLIC BLOOD PRESSURE: 114 MMHG | BODY MASS INDEX: 25.88 KG/M2 | HEIGHT: 67 IN | OXYGEN SATURATION: 99 % | WEIGHT: 164.88 LBS | DIASTOLIC BLOOD PRESSURE: 82 MMHG

## 2022-05-04 NOTE — HISTORICAL [facility]
This is a historical note converted from [place]. Formatting and pictures may have been removed.  Please reference Anthony for original formatting and attached multimedia. PROCEDURE:??Esophagogastroduodenoscopy.  ?  INDICATION:?  Clinical history suggestive of?dyspepsia  ?   CONSENT:?  The benefits, risks, and alternatives to the procedure were discussed and informed consent was obtained from the patient.?  ?  PREPARATION:?  EKG, pulse, pulse oximetry, and blood pressure were monitored throughout the procedure.?  ?   ?  MEDICATIONS:?  Monitored anesthesia care per anesthesiology  ?  PROCEDURE:?The gastroscope was passed through the mouth under direct visualization and was advanced with ease to the second portion of the duodenum. ?The scope was withdrawn and the mucosa was carefully examined. Retroflexion was performed in the stomach. ?Patient tolerated the procedure well.?  ?  FINDINGS:?  ESOPHAGUS:?  Normal esophagus. ?No abnormalities seen.??GE junction seen at 40 cm from incisors.  ?  STOMACH:?  Normal stomach. ?No abnormalities seen.??Mild linear erythema in the antrum. ?Biopsies taken for H. pylori.  ?  DUODENUM:?  Normal bulb and second portion of the duodenum.  ?  ?  UNPLANNED EVENTS:?  There were no unplanned events.?  ?  RECOMMENDATIONS:?  Follow pathology  Ranitidine 150 mg as needed when symptoms occur  ?   EBL:?  ?<5 cc  ?  PATHOLOGY:?  Gastric biopsies for H. pylori  ?   Mild erythema in the stomach but no other concerning pathology.? Biopsies taken for H pylori.? Suspect symptoms mainly functional, dyspeptic in nature.? Will treat with prn ranitidine or anti spasmodic if needed

## 2022-05-04 NOTE — HISTORICAL [facility]
This is a historical note converted from [place]. Formatting and pictures may have been removed.  Please reference Anthony for original formatting and attached multimedia. Chief Complaint  Dysphasia  History of Present Illness  Patient is a 53-year-old female who?has complaints of?dysphagia. ?She states that?solids feel like they get stuck in her chest.? She says that?sometimes food goes down however sometimes?she will burp lay down and he will come back up. ?She also reports periods of?intermittent?reflux and heartburn.? She denies any?bilious or bloody emesis. ?She denies any bloody bowel movements or any blood per rectum. ?She?denies any unintentional weight gain or weight loss. ?She denies any?shortness of breath or chest pain.? Has never had?an endoscopy before.  Review of Systems  Constitutional:?no fever, fatigue, weakness  Eye:?no vision loss, eye redness, drainage, or pain  ENMT:?no sore throat, ear pain, sinus pain/congestion, nasal congestion/drainage  Respiratory:?no cough, no wheezing, no shortness of breath  Cardiovascular:?no chest pain, no palpitations, no edema  Gastrointestinal:?no nausea, some vomiting,?no diarrhea. Intermittent diffuse abdominal pain  Musculoskeletal:?no muscle or joint pain, no joint swelling  Integumentary:?no skin rash or abnormal lesion  Neurologic: no headache, no dizziness, no weakness or numbness  Physical Exam  Vitals & Measurements  T:?37? ?C (Oral)? HR:?83(Monitored)? RR:?20? BP:?132/95? SpO2:?100%? WT:?68?kg?  General:?well-developed well-nourished in no acute distress  Eye: PERRLA, EOMI, clear conjunctiva, eyelids normal  HENT:?TMs/ear canals clear, oropharynx without erythema/exudate  Neck: full range of motion, no thyromegaly or lymphadenopathy  Respiratory:?clear to auscultation bilaterally  Cardiovascular:?regular rate and rhythm  Gastrointestinal:?soft, tender to deep palpation of epigastrium, non-distended with normal bowel sounds, without masses to  palpation  Genitourinary: no CVA tenderness to palpation  Musculoskeletal:?full range of motion of all extremities/spine without limitation or discomfort  Integumentary: no rashes or skin lesions present  Neurologic: cranial nerves intact, no signs of peripheral neurological deficit, motor/sensory function intact  Assessment/Plan  53-year-old female with a history of possible dysphasia  ?  -To the endoscopy suite for?EGD  -Consent obtained after the patient verbalized understanding and risk benefits alternatives to the procedure   Problem List/Past Medical History  Ongoing  Aneurysm  Anxiety  Chronic neck pain  Depression  Dysphagia  Globus sensation  Historical  Morbid obesity  Procedure/Surgical History  hysterectomy   Medications  Inpatient  No active inpatient medications  Home  clonazePAM 0.5 mg oral tablet, 0.5 mg= 1 tab(s), Oral, BID, 1 refills  Cymbalta 60 mg oral delayed release capsule, 60 mg= 1 cap(s), Oral, Daily  Flonase 50 mcg/inh nasal spray, 1 spray(s), Nasal, BID  gabapentin 300 mg oral capsule, 300 mg= 1 cap(s), Oral, BID, 11 refills  olanzapine 5 mg oral tablet, disintegrating, 5 mg= 1 tab(s), Oral, Daily  SUMAtriptan 50 mg oral tablet, 50 mg= 1 tab(s), Oral, Daily, PRN, 1 refills  tiZANidine 4 mg oral tablet, See Instructions, 3 refills  TRAZODONE 150 MG TABLET, 150 mg= 1 tab(s), Oral, qPM  Allergies  Dust  Fioricet with Codeine  clindamycin?(MOUTH ULCERS)  Social History  Alcohol  Current, 1-2 times per year, 04/03/2018  Employment/School  Unemployed, Highest education level: None., 11/10/2015  Exercise - Occasional exercise, 11/10/2015  Home/Environment  Lives with Alone. Living situation: Home/Independent. Alcohol abuse in household: No. Substance abuse in household: No. Smoker in household: No. Injuries/Abuse/Neglect in household: No. Feels unsafe at home: No. Safe place to go: Yes. Agency(s)/Others notified: No. Family/Friends available for support: Yes. Concern for family members at home:  No. Major illness in household: No. TV/Computer concerns: No., 03/05/2018  Nutrition/Health  Regular, Wants to lose weight: No. Sleeping concerns: Yes. Feels highly stressed: Yes., 11/10/2015  Sexual  Substance Abuse  Never, 10/04/2015  Tobacco  Never smoker Use:., 08/21/2018  Family History  Anxiety.: Mother.  Hypertension.: Mother.  Immunizations  Vaccine Date Status Comments   influenza virus vaccine, inactivated 02/12/2018 Recorded    tetanus/diphtheria/pertussis, acel(Tdap) 11/23/2016 Given tolerated well   influenza virus vaccine, inactivated 09/30/2016 Given TOLERATED WELL       she reports?intermittent mid abdominal pain?and?difficulty swallowing usually at times?of stress.? At other times she does not have these symptoms.? Difficulty swallowing is mainly described as feeling as if food and pills get?stuck in the back of her throat.? For the most part she is able to swallow with sips of liquid.? She denies any significant heartburn or reflux except?at times of stress.? Bowel movements are regular without any rectal bleeding or melena.

## 2022-05-12 DIAGNOSIS — R79.89 ELEVATED PROLACTIN LEVEL: ICD-10-CM

## 2022-05-12 DIAGNOSIS — G93.9 BRAIN LESION: ICD-10-CM

## 2022-05-12 DIAGNOSIS — G93.89 BRAIN MASS: Primary | ICD-10-CM

## 2022-05-12 DIAGNOSIS — D35.2 ADENOMA OF PITUITARY: ICD-10-CM

## 2022-06-21 ENCOUNTER — HOSPITAL ENCOUNTER (OUTPATIENT)
Dept: RADIOLOGY | Facility: HOSPITAL | Age: 57
Discharge: HOME | End: 2022-06-21
Attending: STUDENT IN AN ORGANIZED HEALTH CARE EDUCATION/TRAINING PROGRAM
Payer: MEDICAID

## 2022-06-21 DIAGNOSIS — R79.89 ELEVATED PROLACTIN LEVEL: ICD-10-CM

## 2022-06-21 DIAGNOSIS — D35.2 ADENOMA OF PITUITARY: ICD-10-CM

## 2022-06-21 DIAGNOSIS — G93.9 BRAIN LESION: ICD-10-CM

## 2022-06-21 DIAGNOSIS — G93.89 BRAIN MASS: ICD-10-CM

## 2022-06-21 PROCEDURE — A9577 INJ MULTIHANCE: HCPCS | Performed by: STUDENT IN AN ORGANIZED HEALTH CARE EDUCATION/TRAINING PROGRAM

## 2022-06-21 PROCEDURE — 25500020 PHARM REV CODE 255: Performed by: STUDENT IN AN ORGANIZED HEALTH CARE EDUCATION/TRAINING PROGRAM

## 2022-06-21 PROCEDURE — 70553 MRI BRAIN STEM W/O & W/DYE: CPT | Mod: TC

## 2022-06-21 RX ADMIN — GADOBENATE DIMEGLUMINE 20 ML: 529 INJECTION, SOLUTION INTRAVENOUS at 11:06

## 2022-06-22 ENCOUNTER — TELEPHONE (OUTPATIENT)
Dept: INTERNAL MEDICINE | Facility: CLINIC | Age: 57
End: 2022-06-22
Payer: MEDICAID

## 2022-06-22 LAB
CREAT SERPL-MCNC: 0.8 MG/DL (ref 0.5–1.4)
SAMPLE: NORMAL

## 2022-06-22 NOTE — TELEPHONE ENCOUNTER
----- Message from Stephenie Menendez sent at 6/21/2022 12:26 PM CDT -----  Regarding: DR. Licona  Pt request referral to Urologist for frequent UTI's.  Please Advise.    Thanks

## 2022-06-23 DIAGNOSIS — N39.0 RECURRENT UTI: Primary | ICD-10-CM

## 2022-06-23 DIAGNOSIS — E22.1 HYPERPROLACTINEMIA: ICD-10-CM

## 2022-06-23 DIAGNOSIS — D35.2 PITUITARY ADENOMA: Primary | ICD-10-CM

## 2022-06-23 NOTE — PROGRESS NOTES
Called patient today and informed about MRI nadir findings- 2mm pituitary microadenoma. Pt verbalized understanding. All questions answered. Placed Endocrinology referral (urgent) for further evaluation. Only complain pt had was needing Urology referral for her recurrent UTI. I informed pt that I placed the Urology referral as well.     Christina Licona MD   [facility] Internal Medicine PGY-3 Resident   [facility]

## 2022-06-26 ENCOUNTER — HOSPITAL ENCOUNTER (EMERGENCY)
Facility: HOSPITAL | Age: 57
Discharge: HOME | End: 2022-06-26
Attending: FAMILY MEDICINE
Payer: MEDICAID

## 2022-06-26 VITALS
TEMPERATURE: 98 F | OXYGEN SATURATION: 99 % | BODY MASS INDEX: 23.54 KG/M2 | HEART RATE: 72 BPM | RESPIRATION RATE: 18 BRPM | SYSTOLIC BLOOD PRESSURE: 149 MMHG | WEIGHT: 150 LBS | HEIGHT: 67 IN | DIASTOLIC BLOOD PRESSURE: 88 MMHG

## 2022-06-26 DIAGNOSIS — R10.30 ABDOMINAL PAIN, LOWER: Primary | ICD-10-CM

## 2022-06-26 LAB
APPEARANCE UR: CLEAR
BACTERIA #/AREA URNS AUTO: ABNORMAL /HPF
BILIRUB UR QL STRIP.AUTO: NEGATIVE MG/DL
COLOR UR AUTO: ABNORMAL
GLUCOSE UR QL STRIP.AUTO: NEGATIVE MG/DL
KETONES UR QL STRIP.AUTO: NEGATIVE MG/DL
LEUKOCYTE ESTERASE UR QL STRIP.AUTO: NEGATIVE UNIT/L
NITRITE UR QL STRIP.AUTO: NEGATIVE
PH UR STRIP.AUTO: 6 [PH]
PROT UR QL STRIP.AUTO: NEGATIVE MG/DL
RBC #/AREA URNS AUTO: ABNORMAL /HPF
RBC UR QL AUTO: ABNORMAL UNIT/L
SP GR UR STRIP.AUTO: 1.01
SQUAMOUS #/AREA URNS AUTO: ABNORMAL /HPF
UROBILINOGEN UR STRIP-ACNC: 0.2 MG/DL
WBC #/AREA URNS AUTO: ABNORMAL /HPF

## 2022-06-26 PROCEDURE — 96372 THER/PROPH/DIAG INJ SC/IM: CPT | Performed by: FAMILY MEDICINE

## 2022-06-26 PROCEDURE — 63600175 PHARM REV CODE 636 W HCPCS: Performed by: FAMILY MEDICINE

## 2022-06-26 PROCEDURE — 99284 EMERGENCY DEPT VISIT MOD MDM: CPT | Mod: 25

## 2022-06-26 PROCEDURE — 81001 URINALYSIS AUTO W/SCOPE: CPT | Performed by: FAMILY MEDICINE

## 2022-06-26 RX ORDER — CETIRIZINE HYDROCHLORIDE 10 MG/1
TABLET ORAL
COMMUNITY
End: 2022-12-27

## 2022-06-26 RX ORDER — FLUTICASONE PROPIONATE 50 MCG
SPRAY, SUSPENSION (ML) NASAL
COMMUNITY
Start: 2022-06-03 | End: 2022-08-01 | Stop reason: SDUPTHER

## 2022-06-26 RX ORDER — CEFDINIR 300 MG/1
CAPSULE ORAL
COMMUNITY
Start: 2022-06-11 | End: 2022-12-27

## 2022-06-26 RX ORDER — HYDROXYZINE HYDROCHLORIDE 50 MG/1
50 TABLET, FILM COATED ORAL 3 TIMES DAILY PRN
COMMUNITY
Start: 2022-06-11 | End: 2022-12-27

## 2022-06-26 RX ORDER — PHENAZOPYRIDINE HYDROCHLORIDE 200 MG/1
TABLET, FILM COATED ORAL
COMMUNITY
Start: 2022-06-18 | End: 2022-12-27

## 2022-06-26 RX ORDER — TRAMADOL HYDROCHLORIDE 50 MG/1
TABLET ORAL
COMMUNITY
Start: 2022-01-26 | End: 2022-08-09

## 2022-06-26 RX ORDER — PRAZOSIN HYDROCHLORIDE 2 MG/1
4 CAPSULE ORAL NIGHTLY
COMMUNITY
Start: 2022-06-14 | End: 2022-08-09 | Stop reason: SDUPTHER

## 2022-06-26 RX ORDER — SULFAMETHOXAZOLE AND TRIMETHOPRIM 800; 160 MG/1; MG/1
1 TABLET ORAL
COMMUNITY
Start: 2022-06-20 | End: 2022-06-27

## 2022-06-26 RX ORDER — ZOLPIDEM TARTRATE 5 MG/1
TABLET ORAL
COMMUNITY
End: 2022-12-27

## 2022-06-26 RX ORDER — DOXYCYCLINE HYCLATE 100 MG
TABLET ORAL
COMMUNITY
End: 2022-12-27

## 2022-06-26 RX ORDER — PREDNISONE 10 MG/1
TABLET ORAL
COMMUNITY
End: 2022-12-27

## 2022-06-26 RX ORDER — ALPRAZOLAM 1 MG/1
TABLET ORAL
COMMUNITY
End: 2022-12-27

## 2022-06-26 RX ORDER — KETOROLAC TROMETHAMINE 10 MG/1
10 TABLET, FILM COATED ORAL EVERY 6 HOURS
Qty: 16 TABLET | Refills: 0 | Status: SHIPPED | OUTPATIENT
Start: 2022-06-26 | End: 2022-06-30

## 2022-06-26 RX ORDER — KETOROLAC TROMETHAMINE 30 MG/ML
30 INJECTION, SOLUTION INTRAMUSCULAR; INTRAVENOUS
Status: COMPLETED | OUTPATIENT
Start: 2022-06-26 | End: 2022-06-26

## 2022-06-26 RX ORDER — CODEINE PHOSPHATE AND GUAIFENESIN 7.5; 225 MG/5ML; MG/5ML
5 SYRUP ORAL
COMMUNITY
End: 2022-12-27

## 2022-06-26 RX ORDER — GUAIFENESIN 600 MG/1
TABLET, EXTENDED RELEASE ORAL
COMMUNITY
End: 2022-12-27

## 2022-06-26 RX ORDER — ARIPIPRAZOLE 10 MG/1
TABLET ORAL
COMMUNITY
Start: 2022-06-16 | End: 2022-12-27

## 2022-06-26 RX ADMIN — KETOROLAC TROMETHAMINE 30 MG: 30 INJECTION, SOLUTION INTRAMUSCULAR at 04:06

## 2022-06-26 NOTE — ED PROVIDER NOTES
"Encounter Date: 6/26/2022       History     Chief Complaint   Patient presents with    Dysuria     Pt dx with UTI over and over for the last month, has been on 2-3 dif abx. Seen at [facility] and pcp     Pt reports " I am here for pain,  I've been to multiple ER and I have been on multiple antibiotics for UTI but I still hurt"  Pt deneis fever, chills, ha,  Weakness, abdominal pain, N/V/D.     The history is provided by the patient. No  was used.     Review of patient's allergies indicates:   Allergen Reactions    Butalbital-acetaminop-caf-cod     Clindamycin Other (See Comments)     Past Medical History:   Diagnosis Date    Back pain     Neck pain      Past Surgical History:   Procedure Laterality Date    HYSTERECTOMY       Family History   Problem Relation Age of Onset    Diabetes Mother     Diabetes Father     Cancer Maternal Aunt      Social History     Tobacco Use    Smoking status: Never Smoker    Smokeless tobacco: Never Used   Substance Use Topics    Alcohol use: Never    Drug use: Never     Review of Systems   Constitutional: Negative for chills, fatigue and fever.   Gastrointestinal: Positive for abdominal pain. Negative for diarrhea, nausea and vomiting.   Genitourinary: Positive for dysuria. Negative for flank pain and hematuria.   Skin: Negative for rash.   Neurological: Negative for dizziness, seizures, syncope, numbness and headaches.   All other systems reviewed and are negative.      Physical Exam     Initial Vitals [06/26/22 1412]   BP Pulse Resp Temp SpO2   (!) 151/90 76 18 98.1 °F (36.7 °C) 100 %      MAP       --         Physical Exam    Nursing note and vitals reviewed.  HENT:   Head: Normocephalic and atraumatic.   Eyes: Conjunctivae are normal.   Cardiovascular: Normal heart sounds.   Pulmonary/Chest: Breath sounds normal.   Abdominal: Abdomen is soft. Bowel sounds are normal. She exhibits no distension.   Suprapubic tenderness -mild . No acute abdomen  There is " no rebound and no guarding.   Musculoskeletal:         General: Normal range of motion.     Neurological:   AAOx4, no acute neuro deficits, GCS 15, normal gait.    Skin: Skin is warm and dry. Capillary refill takes less than 2 seconds.   Psychiatric: She has a normal mood and affect. Her behavior is normal. Judgment and thought content normal.         ED Course   Procedures  Labs Reviewed   URINALYSIS, REFLEX TO URINE CULTURE - Abnormal; Notable for the following components:       Result Value    Color, UA Straw (*)     Blood, UA Small (*)     All other components within normal limits   URINALYSIS, MICROSCOPIC - Abnormal; Notable for the following components:    Squamous Epithelial Cells, UA Few (*)     All other components within normal limits          Imaging Results    None          Medications   ketorolac injection 30 mg (has no administration in time range)     Medical Decision Making:   ED Management:  Patient not ill or septic appearing today.  Patient declines blood work as she reports that she has already had blood work.  Reviewed urine results with patient today.  Physical exam pertinent for mild suprapubic tenderness.  Strict ER precautions were given to the patient who verbalized understanding.  Patient is stable for discharge.                      Clinical Impression:   Final diagnoses:  [R10.30] Abdominal pain, lower (Primary)          ED Disposition Condition    Discharge Stable        ED Prescriptions     Medication Sig Dispense Start Date End Date Auth. Provider    ketorolac (TORADOL) 10 mg tablet Take 1 tablet (10 mg total) by mouth every 6 (six) hours. for 4 days 16 tablet 6/26/2022 6/30/2022 Rosa Aldridge MD        Follow-up Information     Follow up With Specialties Details Why Contact Info    Christina Licona MD Internal Medicine Schedule an appointment as soon as possible for a visit in 2 days  [address]  [place] [address]  [phone]      [facility] - Emergency Dept  Emergency Medicine  As needed, If symptoms worsen [address]  [facility] [id number] [phone]           Rosa Aldridge MD  06/26/22 [id number]

## 2022-07-27 ENCOUNTER — HOSPITAL ENCOUNTER (OUTPATIENT)
Dept: RADIOLOGY | Facility: HOSPITAL | Age: 57
Discharge: HOME | End: 2022-07-27
Attending: NURSE PRACTITIONER
Payer: MEDICAID

## 2022-07-27 DIAGNOSIS — Z12.31 BREAST CANCER SCREENING BY MAMMOGRAM: ICD-10-CM

## 2022-07-27 PROCEDURE — 77063 MAMMO DIGITAL SCREENING BILAT WITH TOMO: ICD-10-PCS | Mod: 26,,, | Performed by: RADIOLOGY

## 2022-07-27 PROCEDURE — 77067 MAMMO DIGITAL SCREENING BILAT WITH TOMO: ICD-10-PCS | Mod: 26,,, | Performed by: RADIOLOGY

## 2022-07-27 PROCEDURE — 77067 SCR MAMMO BI INCL CAD: CPT | Mod: TC

## 2022-07-27 PROCEDURE — 77067 SCR MAMMO BI INCL CAD: CPT | Mod: 26,,, | Performed by: RADIOLOGY

## 2022-07-27 PROCEDURE — 77063 BREAST TOMOSYNTHESIS BI: CPT | Mod: 26,,, | Performed by: RADIOLOGY

## 2022-08-01 RX ORDER — FLUTICASONE PROPIONATE 50 MCG
1 SPRAY, SUSPENSION (ML) NASAL 2 TIMES DAILY
Qty: 16 G | Refills: 1 | Status: SHIPPED | OUTPATIENT
Start: 2022-08-01 | End: 2023-04-20 | Stop reason: SDUPTHER

## 2022-08-09 ENCOUNTER — OFFICE VISIT (OUTPATIENT)
Dept: INTERNAL MEDICINE | Facility: CLINIC | Age: 57
End: 2022-08-09
Payer: MEDICAID

## 2022-08-09 VITALS
BODY MASS INDEX: 27.02 KG/M2 | SYSTOLIC BLOOD PRESSURE: 118 MMHG | RESPIRATION RATE: 20 BRPM | TEMPERATURE: 99 F | HEIGHT: 67 IN | WEIGHT: 172.19 LBS | HEART RATE: 76 BPM | DIASTOLIC BLOOD PRESSURE: 81 MMHG

## 2022-08-09 DIAGNOSIS — M54.12 CERVICAL RADICULOPATHY: Primary | ICD-10-CM

## 2022-08-09 DIAGNOSIS — K59.00 CONSTIPATION, UNSPECIFIED CONSTIPATION TYPE: ICD-10-CM

## 2022-08-09 DIAGNOSIS — E78.1 HYPERTRIGLYCERIDEMIA: ICD-10-CM

## 2022-08-09 DIAGNOSIS — F32.A DEPRESSION, UNSPECIFIED DEPRESSION TYPE: ICD-10-CM

## 2022-08-09 DIAGNOSIS — D35.2 PITUITARY ADENOMA: ICD-10-CM

## 2022-08-09 DIAGNOSIS — M54.9 BACK PAIN, UNSPECIFIED BACK LOCATION, UNSPECIFIED BACK PAIN LATERALITY, UNSPECIFIED CHRONICITY: ICD-10-CM

## 2022-08-09 DIAGNOSIS — E11.69 TYPE 2 DIABETES MELLITUS WITH OTHER SPECIFIED COMPLICATION, WITHOUT LONG-TERM CURRENT USE OF INSULIN: ICD-10-CM

## 2022-08-09 DIAGNOSIS — E04.2 MULTIPLE THYROID NODULES: ICD-10-CM

## 2022-08-09 DIAGNOSIS — M85.862 OSTEOPENIA OF LEFT LOWER LEG: ICD-10-CM

## 2022-08-09 DIAGNOSIS — Z00.00 HEALTHCARE MAINTENANCE: ICD-10-CM

## 2022-08-09 DIAGNOSIS — F43.10 PTSD (POST-TRAUMATIC STRESS DISORDER): ICD-10-CM

## 2022-08-09 DIAGNOSIS — I10 HYPERTENSION, UNSPECIFIED TYPE: ICD-10-CM

## 2022-08-09 DIAGNOSIS — E78.5 HYPERLIPIDEMIA, UNSPECIFIED HYPERLIPIDEMIA TYPE: ICD-10-CM

## 2022-08-09 DIAGNOSIS — M54.16 LUMBAR RADICULOPATHY: ICD-10-CM

## 2022-08-09 DIAGNOSIS — N18.2 CKD (CHRONIC KIDNEY DISEASE) STAGE 2, GFR 60-89 ML/MIN: ICD-10-CM

## 2022-08-09 PROBLEM — F41.9 ANXIETY DISORDER, UNSPECIFIED: Status: ACTIVE | Noted: 2022-08-09

## 2022-08-09 PROBLEM — E11.9 DM (DIABETES MELLITUS), TYPE 2: Status: ACTIVE | Noted: 2022-08-09

## 2022-08-09 PROCEDURE — 99214 OFFICE O/P EST MOD 30 MIN: CPT | Mod: PBBFAC

## 2022-08-09 RX ORDER — PRAZOSIN HYDROCHLORIDE 2 MG/1
4 CAPSULE ORAL NIGHTLY
Qty: 60 CAPSULE | Refills: 0 | Status: SHIPPED | OUTPATIENT
Start: 2022-08-09 | End: 2023-04-17

## 2022-08-09 NOTE — PROGRESS NOTES
Attending Physician Addendum  Pt seen and discussed with medicine resident in clinic  Care provided is appropriate    Agree with above ASSESSMENT AND PLAN
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
## 2022-08-11 ENCOUNTER — OFFICE VISIT (OUTPATIENT)
Dept: ENDOCRINOLOGY | Facility: CLINIC | Age: 57
End: 2022-08-11
Payer: MEDICAID

## 2022-08-11 VITALS
TEMPERATURE: 98 F | BODY MASS INDEX: 26.66 KG/M2 | RESPIRATION RATE: 20 BRPM | SYSTOLIC BLOOD PRESSURE: 146 MMHG | DIASTOLIC BLOOD PRESSURE: 88 MMHG | WEIGHT: 169.88 LBS | HEART RATE: 77 BPM | HEIGHT: 67 IN

## 2022-08-11 DIAGNOSIS — I10 HYPERTENSION, UNSPECIFIED TYPE: ICD-10-CM

## 2022-08-11 DIAGNOSIS — Z11.59 NEED FOR HEPATITIS C SCREENING TEST: Primary | ICD-10-CM

## 2022-08-11 DIAGNOSIS — D35.2 PITUITARY ADENOMA: ICD-10-CM

## 2022-08-11 DIAGNOSIS — Z11.59 NEED FOR HEPATITIS C SCREENING TEST: ICD-10-CM

## 2022-08-11 DIAGNOSIS — E22.1 HYPERPROLACTINEMIA: ICD-10-CM

## 2022-08-11 LAB
ANION GAP SERPL CALC-SCNC: 7 MEQ/L
BUN SERPL-MCNC: 11.9 MG/DL (ref 9.8–20.1)
CALCIUM SERPL-MCNC: 10.3 MG/DL (ref 8.4–10.2)
CHLORIDE SERPL-SCNC: 103 MMOL/L (ref 98–107)
CO2 SERPL-SCNC: 30 MMOL/L (ref 22–29)
CORTIS SERPL-SCNC: 6.2 UG/DL
CREAT SERPL-MCNC: 0.83 MG/DL (ref 0.55–1.02)
CREAT/UREA NIT SERPL: 14
GFR SERPLBLD CREATININE-BSD FMLA CKD-EPI: >60 MLS/MIN/1.73/M2
GLUCOSE SERPL-MCNC: 104 MG/DL (ref 74–100)
HAV IGM SERPL QL IA: NONREACTIVE
HBV CORE IGM SERPL QL IA: NONREACTIVE
HBV SURFACE AG SERPL QL IA: NONREACTIVE
HCV AB SERPL QL IA: NONREACTIVE
POTASSIUM SERPL-SCNC: 4 MMOL/L (ref 3.5–5.1)
PROLACTIN LEVEL (OHS): 23.59 NG/ML (ref 5.18–26.53)
SODIUM SERPL-SCNC: 140 MMOL/L (ref 136–145)
T4 FREE SERPL-MCNC: 1.09 NG/DL (ref 0.7–1.48)
TSH SERPL-ACNC: 0.79 UIU/ML (ref 0.35–4.94)

## 2022-08-11 PROCEDURE — 80074 ACUTE HEPATITIS PANEL: CPT

## 2022-08-11 PROCEDURE — 99214 PR OFFICE/OUTPT VISIT, EST, LEVL IV, 30-39 MIN: ICD-10-PCS | Mod: S$PBB,,, | Performed by: NURSE PRACTITIONER

## 2022-08-11 PROCEDURE — 84443 ASSAY THYROID STIM HORMONE: CPT

## 2022-08-11 PROCEDURE — 84146 ASSAY OF PROLACTIN: CPT

## 2022-08-11 PROCEDURE — 3008F PR BODY MASS INDEX (BMI) DOCUMENTED: ICD-10-PCS | Mod: CPTII,,, | Performed by: NURSE PRACTITIONER

## 2022-08-11 PROCEDURE — 3077F PR MOST RECENT SYSTOLIC BLOOD PRESSURE >= 140 MM HG: ICD-10-PCS | Mod: CPTII,,, | Performed by: NURSE PRACTITIONER

## 2022-08-11 PROCEDURE — 1160F RVW MEDS BY RX/DR IN RCRD: CPT | Mod: CPTII,,, | Performed by: NURSE PRACTITIONER

## 2022-08-11 PROCEDURE — 3079F DIAST BP 80-89 MM HG: CPT | Mod: CPTII,,, | Performed by: NURSE PRACTITIONER

## 2022-08-11 PROCEDURE — 36415 COLL VENOUS BLD VENIPUNCTURE: CPT

## 2022-08-11 PROCEDURE — 99214 OFFICE O/P EST MOD 30 MIN: CPT | Mod: S$PBB,,, | Performed by: NURSE PRACTITIONER

## 2022-08-11 PROCEDURE — 80048 BASIC METABOLIC PNL TOTAL CA: CPT

## 2022-08-11 PROCEDURE — 3008F BODY MASS INDEX DOCD: CPT | Mod: CPTII,,, | Performed by: NURSE PRACTITIONER

## 2022-08-11 PROCEDURE — 1159F MED LIST DOCD IN RCRD: CPT | Mod: CPTII,,, | Performed by: NURSE PRACTITIONER

## 2022-08-11 PROCEDURE — 3077F SYST BP >= 140 MM HG: CPT | Mod: CPTII,,, | Performed by: NURSE PRACTITIONER

## 2022-08-11 PROCEDURE — 84439 ASSAY OF FREE THYROXINE: CPT

## 2022-08-11 PROCEDURE — 3079F PR MOST RECENT DIASTOLIC BLOOD PRESSURE 80-89 MM HG: ICD-10-PCS | Mod: CPTII,,, | Performed by: NURSE PRACTITIONER

## 2022-08-11 PROCEDURE — 1159F PR MEDICATION LIST DOCUMENTED IN MEDICAL RECORD: ICD-10-PCS | Mod: CPTII,,, | Performed by: NURSE PRACTITIONER

## 2022-08-11 PROCEDURE — 1160F PR REVIEW ALL MEDS BY PRESCRIBER/CLIN PHARMACIST DOCUMENTED: ICD-10-PCS | Mod: CPTII,,, | Performed by: NURSE PRACTITIONER

## 2022-08-11 PROCEDURE — 99215 OFFICE O/P EST HI 40 MIN: CPT | Mod: PBBFAC | Performed by: NURSE PRACTITIONER

## 2022-08-11 NOTE — PROGRESS NOTES
Subjective:       Patient ID: Adelina Rosales is a 57 y.o. female.    Chief Complaint: Pituitary adenoma new patient referral     Endocrine clinic note 08/11/2022    51-year-old female scheduled today as new patient referral to endocrine clinic for pituitary microadenoma and hyperprolactinemia.  Patient had MRI head MRI 06/21/20221. Suggestion of a left pituitary 0.2 cm microadenoma.  Patient previously had a history of migraine headaches but states that time she has a headache that is in the center of her forehead between her eyes has occurred more frequently.  Patient was found to have elevated prolactin level of 70.90 previous prolactin levels were 54.04 and  21.32 years ago.  Patient denies any galactorrhea or breast fullness.  Patient is postmenopausal and has not had cycles and almost 9 years.  Will complete workup no start cabergoline due to elevated prolactin level.    Narrative & Impression  EXAMINATION:  MRI BRAIN PITUITARY W W/O CONTRAST     CLINICAL HISTORY:  brain or lesion suspected; elevated prolactin with daily migraines; r/o pituitary adenoma;;Other specified disorders of brain     TECHNIQUE:  Multiplanar, multisequence MR images through the sella were obtained before and after the administration of intravenous gadolinium based contrast.     COMPARISON:  Head CT 04/18/2022.     Brain MRI 05/08/2012.     FINDINGS:  Sella: Normal in size.     Pituitary gland: Left pituitary 0.2 cm focus of relative hypoenhancement (series 15, image 32).  Otherwise normal in height and signal.     Pituitary stalk: At midline.     Optic chiasm: Well visualized, normal in size and signal.     Cavernous sinuses: Normal in size and symmetric.     Clivus: Intact.     Sphenoid Sinuses: No T2 hyperintense inflammatory changes.     Visualized brain parenchyma:     No mass, hemorrhage or acute vascular insults.     Incidental left middle frontal developmental venous anomaly.     Otherwise no enhancing  abnormalities.     Ventricles are normal in size and configuration. No hydrocephalus.     No extra-axial masses or fluid collections.     Normal flow voids in the major arteries and veins.     No craniocervical abnormalities.     Impression:     1. Suggestion of a left pituitary 0.2 cm microadenoma.     2. No acute intracranial abnormalities.        Electronically signed by: Christiano Aldridge  Date:                                            06/21/2022  Time:                                           12:36    Exam Ended: 06/21/22 11:55 Last Resulted: 06/21/22 12:36            Review of Systems   Constitutional: Negative for activity change, appetite change and fatigue.   HENT: Negative for dental problem, hearing loss, tinnitus, trouble swallowing and goiter.    Eyes: Negative for photophobia, pain and visual disturbance.   Respiratory: Negative for cough, chest tightness and wheezing.    Cardiovascular: Negative for chest pain, palpitations and leg swelling.   Gastrointestinal: Negative for abdominal pain, constipation, diarrhea, nausea and reflux.   Endocrine: Negative for cold intolerance, heat intolerance, polydipsia and polyphagia.   Genitourinary: Negative for difficulty urinating, flank pain, hematuria, hot flashes, menstrual irregularity, menstrual problem, nocturia and urgency.   Musculoskeletal: Negative for back pain, gait problem, joint swelling, leg pain and joint deformity.   Integumentary:  Negative for color change, pallor, rash and breast discharge.   Allergic/Immunologic: Negative for environmental allergies, food allergies and immunocompromised state.   Neurological: Negative for tremors, seizures, headaches, disturbances in coordination, memory loss and coordination difficulties.   Psychiatric/Behavioral: Negative for agitation, behavioral problems and sleep disturbance. The patient is not nervous/anxious.          Objective:      Physical Exam  Constitutional:       General: She is not in acute  distress.     Appearance: Normal appearance. She is not ill-appearing.   HENT:      Head: Normocephalic and atraumatic.      Right Ear: External ear normal.      Left Ear: External ear normal.      Nose: Nose normal. No congestion or rhinorrhea.      Mouth/Throat:      Mouth: Mucous membranes are moist.      Pharynx: Oropharynx is clear. No oropharyngeal exudate.   Eyes:      General:         Right eye: No discharge.         Left eye: No discharge.      Conjunctiva/sclera: Conjunctivae normal.      Pupils: Pupils are equal, round, and reactive to light.   Neck:      Thyroid: No thyroid mass, thyromegaly or thyroid tenderness.   Cardiovascular:      Rate and Rhythm: Normal rate and regular rhythm.      Pulses: Normal pulses.      Heart sounds: Normal heart sounds. No murmur heard.  Pulmonary:      Effort: Pulmonary effort is normal. No respiratory distress.      Breath sounds: Normal breath sounds.   Abdominal:      General: Abdomen is flat. Bowel sounds are normal. There is no distension.      Palpations: Abdomen is soft.      Tenderness: There is no abdominal tenderness.   Musculoskeletal:         General: No swelling or tenderness. Normal range of motion.      Cervical back: Normal range of motion and neck supple. No tenderness.      Right lower leg: No edema.      Left lower leg: No edema.   Feet:      Right foot:      Skin integrity: Skin integrity normal.      Left foot:      Skin integrity: Skin integrity normal.   Lymphadenopathy:      Cervical: No cervical adenopathy.   Skin:     General: Skin is warm and dry.      Coloration: Skin is not jaundiced or pale.   Neurological:      General: No focal deficit present.      Mental Status: She is alert and oriented to person, place, and time. Mental status is at baseline.      Coordination: Coordination normal.      Gait: Gait normal.   Psychiatric:         Mood and Affect: Mood normal.         Behavior: Behavior normal.         Thought Content: Thought content  normal.         Assessment:       Problem List Items Addressed This Visit        Cardiac/Vascular    HTN (hypertension)    Relevant Orders    Aldosterone    Renin       Endocrine    Pituitary adenoma    Relevant Orders    TSH    T4, Free    ACTH    Cortisol    Insulin-Like Growth Factor    Prolactin    Basic Metabolic Panel      Other Visit Diagnoses     Need for hepatitis C screening test    -  Primary    Relevant Orders    Hepatitis Panel, Acute    Hyperprolactinemia              Plan:       Pituitary adenoma  MRI 6/21/22 a 0.2 cm microadenoma  Labs today TSH, free T4, acth, cortisol, IGF-1, prolactin and BMP today  Start cabergoline once labs completed  Return to clinic in 6 months  -     Ambulatory referral/consult to Endocrinology  -     TSH; Future; Expected date: 08/11/2022  -     T4, Free; Future; Expected date: 08/11/2022  -     ACTH; Future; Expected date: 08/11/2022  -     Cortisol; Future; Expected date: 08/11/2022  -     Insulin-Like Growth Factor; Future; Expected date: 08/11/2022  -     Prolactin; Future; Expected date: 08/11/2022  -     Basic Metabolic Panel; Future; Expected date: 08/11/2022    Hyperprolactinemia  See above  -     Ambulatory referral/consult to Endocrinology    Component Ref Range & Units 7 mo ago 1 yr ago 2 yr ago   Prolactin Level 5.18 - 26.53 ng/mL 78.90 High   54.04 High   21.3 R      Hypertension, unspecified type  History of uncontrolled hypertension check aldosterone renal level today  -     Aldosterone; Future; Expected date: 08/11/2022  -     Renin; Future; Expected date: 08/11/2022    Need for hepatitis C screening test  -     Hepatitis Panel, Acute; Future; Expected date: 08/11/2022    I spent a total of 45 minutes on the day of the visit.  This includes face to face time and non-face to face time preparing to see the patient (eg, review of tests), obtaining and/or reviewing separately obtained history, documenting clinical information in the electronic or other health  record, independently interpreting results and communicating results to the patient/family/caregiver, or care coordinator.

## 2022-08-12 LAB
ACTH PLAS-MCNC: 9.1 PG/ML
PATH REV: NORMAL

## 2022-08-15 DIAGNOSIS — E83.52 HYPERCALCEMIA: ICD-10-CM

## 2022-08-15 DIAGNOSIS — D35.2 PITUITARY MICROADENOMA: Primary | ICD-10-CM

## 2022-08-16 LAB
ALDOST SERPL-MCNC: 5.3 NG/DL
IGF-I SERPL-MCNC: 97 NG/ML (ref 37–208)
IGF-I Z-SCORE SERPL: -0.13 SD
NONINV COLON CA DNA+OCC BLD SCRN STL QL: NORMAL
RENIN PLAS-CCNC: <0.6 NG/ML/H

## 2022-08-19 ENCOUNTER — CLINICAL SUPPORT (OUTPATIENT)
Dept: ENDOCRINOLOGY | Facility: CLINIC | Age: 57
End: 2022-08-19
Payer: MEDICAID

## 2022-08-19 DIAGNOSIS — D35.2 PITUITARY MICROADENOMA: ICD-10-CM

## 2022-08-19 DIAGNOSIS — R79.89 ABNORMAL CORTISOL LEVEL: Primary | ICD-10-CM

## 2022-08-19 LAB
CORTIS SERPL-SCNC: 17.5 UG/DL
CORTIS SERPL-SCNC: 22.7 UG/DL
CORTIS SERPL-SCNC: 9.1 UG/DL

## 2022-08-19 PROCEDURE — 36415 COLL VENOUS BLD VENIPUNCTURE: CPT

## 2022-08-19 PROCEDURE — 99212 OFFICE O/P EST SF 10 MIN: CPT | Mod: PBBFAC

## 2022-08-19 PROCEDURE — 82533 TOTAL CORTISOL: CPT

## 2022-08-19 RX ORDER — COSYNTROPIN 0.25 MG/ML
0.25 INJECTION, POWDER, FOR SOLUTION INTRAMUSCULAR; INTRAVENOUS
Status: COMPLETED | OUTPATIENT
Start: 2022-08-19 | End: 2022-08-19

## 2022-08-19 RX ORDER — COSYNTROPIN 0.25 MG/ML
0.25 INJECTION, POWDER, FOR SOLUTION INTRAMUSCULAR; INTRAVENOUS
Status: CANCELLED | OUTPATIENT
Start: 2022-08-19

## 2022-08-19 RX ADMIN — COSYNTROPIN 0.25 MG: 0.25 INJECTION, POWDER, LYOPHILIZED, FOR SOLUTION INTRAVENOUS at 07:08

## 2022-08-20 LAB — ACTH PLAS-MCNC: 8.7 PG/ML

## 2022-08-31 LAB — NONINV COLON CA DNA+OCC BLD SCRN STL QL: NEGATIVE

## 2022-12-27 ENCOUNTER — OFFICE VISIT (OUTPATIENT)
Dept: INTERNAL MEDICINE | Facility: CLINIC | Age: 57
End: 2022-12-27
Payer: MEDICAID

## 2022-12-27 VITALS
TEMPERATURE: 98 F | RESPIRATION RATE: 18 BRPM | SYSTOLIC BLOOD PRESSURE: 136 MMHG | HEIGHT: 67 IN | HEART RATE: 93 BPM | DIASTOLIC BLOOD PRESSURE: 94 MMHG | BODY MASS INDEX: 26.94 KG/M2 | WEIGHT: 171.63 LBS

## 2022-12-27 DIAGNOSIS — G47.00 INSOMNIA, UNSPECIFIED TYPE: ICD-10-CM

## 2022-12-27 DIAGNOSIS — I10 HYPERTENSION, UNSPECIFIED TYPE: Primary | ICD-10-CM

## 2022-12-27 DIAGNOSIS — Z79.899 MEDICATION MANAGEMENT: ICD-10-CM

## 2022-12-27 DIAGNOSIS — R29.818 SUSPECTED SLEEP APNEA: ICD-10-CM

## 2022-12-27 DIAGNOSIS — E78.5 HYPERLIPIDEMIA, UNSPECIFIED HYPERLIPIDEMIA TYPE: ICD-10-CM

## 2022-12-27 DIAGNOSIS — Z01.89 ROUTINE LAB DRAW: ICD-10-CM

## 2022-12-27 DIAGNOSIS — R73.03 PREDIABETES: ICD-10-CM

## 2022-12-27 DIAGNOSIS — K21.9 GASTROESOPHAGEAL REFLUX DISEASE, UNSPECIFIED WHETHER ESOPHAGITIS PRESENT: ICD-10-CM

## 2022-12-27 PROBLEM — D64.9 NORMOCYTIC ANEMIA: Status: ACTIVE | Noted: 2022-12-27

## 2022-12-27 PROBLEM — R09.A2 GLOBUS SENSATION: Status: ACTIVE | Noted: 2022-12-27

## 2022-12-27 PROBLEM — E11.9 DM (DIABETES MELLITUS), TYPE 2: Status: RESOLVED | Noted: 2022-08-09 | Resolved: 2022-12-27

## 2022-12-27 LAB — HBA1C MFR BLD: 6 %

## 2022-12-27 PROCEDURE — 99214 OFFICE O/P EST MOD 30 MIN: CPT | Mod: PBBFAC

## 2022-12-27 PROCEDURE — 83036 HEMOGLOBIN GLYCOSYLATED A1C: CPT | Mod: PBBFAC

## 2022-12-27 RX ORDER — HYDROXYZINE PAMOATE 25 MG/1
25 CAPSULE ORAL EVERY 8 HOURS PRN
Qty: 30 CAPSULE | Refills: 3 | Status: SHIPPED | OUTPATIENT
Start: 2022-12-27 | End: 2023-01-31 | Stop reason: SDUPTHER

## 2022-12-27 RX ORDER — OMEPRAZOLE 20 MG/1
20 CAPSULE, DELAYED RELEASE ORAL DAILY
Qty: 60 CAPSULE | Refills: 3 | Status: SHIPPED | OUTPATIENT
Start: 2022-12-27 | End: 2023-04-17

## 2023-01-09 RX ORDER — LURASIDONE HYDROCHLORIDE 60 MG/1
60 TABLET, FILM COATED ORAL DAILY
Qty: 30 TABLET | Refills: 3 | Status: SHIPPED | OUTPATIENT
Start: 2023-01-09

## 2023-01-09 RX ORDER — LURASIDONE HYDROCHLORIDE 60 MG/1
60 TABLET, FILM COATED ORAL DAILY
COMMUNITY
End: 2023-01-09 | Stop reason: SDUPTHER

## 2023-01-17 ENCOUNTER — HOSPITAL ENCOUNTER (EMERGENCY)
Facility: HOSPITAL | Age: 58
Discharge: HOME | End: 2023-01-17
Attending: INTERNAL MEDICINE
Payer: MEDICAID

## 2023-01-17 VITALS
TEMPERATURE: 98 F | DIASTOLIC BLOOD PRESSURE: 99 MMHG | OXYGEN SATURATION: 98 % | HEIGHT: 67 IN | RESPIRATION RATE: 16 BRPM | BODY MASS INDEX: 26.88 KG/M2 | SYSTOLIC BLOOD PRESSURE: 155 MMHG | HEART RATE: 78 BPM

## 2023-01-17 DIAGNOSIS — T07.XXXA MULTIPLE CONTUSIONS: Primary | ICD-10-CM

## 2023-01-17 DIAGNOSIS — T14.90XA TRAUMA: ICD-10-CM

## 2023-01-17 PROCEDURE — 99284 EMERGENCY DEPT VISIT MOD MDM: CPT | Mod: 25

## 2023-01-17 PROCEDURE — 25000003 PHARM REV CODE 250: Performed by: INTERNAL MEDICINE

## 2023-01-17 PROCEDURE — 63600175 PHARM REV CODE 636 W HCPCS: Performed by: INTERNAL MEDICINE

## 2023-01-17 PROCEDURE — 96372 THER/PROPH/DIAG INJ SC/IM: CPT | Performed by: INTERNAL MEDICINE

## 2023-01-17 RX ORDER — HYDROCODONE BITARTRATE AND ACETAMINOPHEN 7.5; 325 MG/1; MG/1
1 TABLET ORAL EVERY 6 HOURS PRN
Status: DISCONTINUED | OUTPATIENT
Start: 2023-01-17 | End: 2023-01-17 | Stop reason: HOSPADM

## 2023-01-17 RX ORDER — KETOROLAC TROMETHAMINE 30 MG/ML
30 INJECTION, SOLUTION INTRAMUSCULAR; INTRAVENOUS
Status: COMPLETED | OUTPATIENT
Start: 2023-01-17 | End: 2023-01-17

## 2023-01-17 RX ORDER — METHOCARBAMOL 500 MG/1
500 TABLET, FILM COATED ORAL 3 TIMES DAILY PRN
Qty: 15 TABLET | Refills: 0 | Status: SHIPPED | OUTPATIENT
Start: 2023-01-17 | End: 2023-01-22

## 2023-01-17 RX ORDER — HYDROCODONE BITARTRATE AND ACETAMINOPHEN 5; 325 MG/1; MG/1
1 TABLET ORAL EVERY 8 HOURS PRN
Qty: 10 TABLET | Refills: 0 | Status: SHIPPED | OUTPATIENT
Start: 2023-01-17 | End: 2023-08-10

## 2023-01-17 RX ADMIN — HYDROCODONE BITARTRATE AND ACETAMINOPHEN 1 TABLET: 7.5; 325 TABLET ORAL at 12:01

## 2023-01-17 RX ADMIN — KETOROLAC TROMETHAMINE 30 MG: 30 INJECTION, SOLUTION INTRAMUSCULAR; INTRAVENOUS at 12:01

## 2023-01-17 NOTE — Clinical Note
"Adelina Pineda" Bobby was seen and treated in our emergency department on 1/17/2023.  She may return to work on 01/19/2023.       If you have any questions or concerns, please don't hesitate to call.       LPN    "
[employer] Safety Interventions
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
## 2023-01-18 ENCOUNTER — OFFICE VISIT (OUTPATIENT)
Dept: GYNECOLOGY | Facility: CLINIC | Age: 58
End: 2023-01-18
Payer: MEDICAID

## 2023-01-18 VITALS
WEIGHT: 166 LBS | DIASTOLIC BLOOD PRESSURE: 90 MMHG | BODY MASS INDEX: 26.06 KG/M2 | OXYGEN SATURATION: 98 % | HEIGHT: 67 IN | RESPIRATION RATE: 16 BRPM | SYSTOLIC BLOOD PRESSURE: 135 MMHG | TEMPERATURE: 99 F | HEART RATE: 77 BPM

## 2023-01-18 DIAGNOSIS — Z01.419 WOMEN'S ANNUAL ROUTINE GYNECOLOGICAL EXAMINATION: Primary | ICD-10-CM

## 2023-01-18 DIAGNOSIS — Z12.31 SCREENING MAMMOGRAM FOR BREAST CANCER: ICD-10-CM

## 2023-01-18 DIAGNOSIS — N95.2 ATROPHIC VAGINITIS: ICD-10-CM

## 2023-01-18 LAB
APPEARANCE UR: CLEAR
BACTERIA #/AREA URNS AUTO: ABNORMAL /HPF
BILIRUB SERPL-MCNC: NORMAL MG/DL
BILIRUB UR QL STRIP.AUTO: NEGATIVE MG/DL
BLOOD URINE, POC: NORMAL
COLOR UR AUTO: ABNORMAL
COLOR, POC UA: YELLOW
GLUCOSE UR QL STRIP.AUTO: NORMAL MG/DL
GLUCOSE UR QL STRIP: NORMAL
HYALINE CASTS #/AREA URNS LPF: ABNORMAL /LPF
KETONES UR QL STRIP.AUTO: NEGATIVE MG/DL
KETONES UR QL STRIP: NORMAL
LEUKOCYTE ESTERASE UR QL STRIP.AUTO: 25 UNIT/L
LEUKOCYTE ESTERASE URINE, POC: NORMAL
NITRITE UR QL STRIP.AUTO: NEGATIVE
NITRITE, POC UA: NORMAL
PH UR STRIP.AUTO: 6.5 [PH]
PH, POC UA: 7
PROT UR QL STRIP.AUTO: NEGATIVE MG/DL
PROTEIN, POC: NORMAL
RBC #/AREA URNS AUTO: ABNORMAL /HPF
RBC UR QL AUTO: ABNORMAL UNIT/L
SP GR UR STRIP.AUTO: 1.02
SPECIFIC GRAVITY, POC UA: 1.01
SQUAMOUS #/AREA URNS LPF: ABNORMAL /HPF
UROBILINOGEN UR STRIP-ACNC: NORMAL MG/DL
UROBILINOGEN, POC UA: NORMAL
WBC #/AREA URNS AUTO: ABNORMAL /HPF

## 2023-01-18 PROCEDURE — 99396 PR PREVENTIVE VISIT,EST,40-64: ICD-10-PCS | Mod: S$PBB,,, | Performed by: NURSE PRACTITIONER

## 2023-01-18 PROCEDURE — 3008F PR BODY MASS INDEX (BMI) DOCUMENTED: ICD-10-PCS | Mod: CPTII,,, | Performed by: NURSE PRACTITIONER

## 2023-01-18 PROCEDURE — 1160F PR REVIEW ALL MEDS BY PRESCRIBER/CLIN PHARMACIST DOCUMENTED: ICD-10-PCS | Mod: CPTII,,, | Performed by: NURSE PRACTITIONER

## 2023-01-18 PROCEDURE — 3080F PR MOST RECENT DIASTOLIC BLOOD PRESSURE >= 90 MM HG: ICD-10-PCS | Mod: CPTII,,, | Performed by: NURSE PRACTITIONER

## 2023-01-18 PROCEDURE — 1159F PR MEDICATION LIST DOCUMENTED IN MEDICAL RECORD: ICD-10-PCS | Mod: CPTII,,, | Performed by: NURSE PRACTITIONER

## 2023-01-18 PROCEDURE — 99396 PREV VISIT EST AGE 40-64: CPT | Mod: S$PBB,,, | Performed by: NURSE PRACTITIONER

## 2023-01-18 PROCEDURE — 3080F DIAST BP >= 90 MM HG: CPT | Mod: CPTII,,, | Performed by: NURSE PRACTITIONER

## 2023-01-18 PROCEDURE — 3008F BODY MASS INDEX DOCD: CPT | Mod: CPTII,,, | Performed by: NURSE PRACTITIONER

## 2023-01-18 PROCEDURE — 99215 OFFICE O/P EST HI 40 MIN: CPT | Mod: PBBFAC | Performed by: NURSE PRACTITIONER

## 2023-01-18 PROCEDURE — 3075F PR MOST RECENT SYSTOLIC BLOOD PRESS GE 130-139MM HG: ICD-10-PCS | Mod: CPTII,,, | Performed by: NURSE PRACTITIONER

## 2023-01-18 PROCEDURE — 3075F SYST BP GE 130 - 139MM HG: CPT | Mod: CPTII,,, | Performed by: NURSE PRACTITIONER

## 2023-01-18 PROCEDURE — 1160F RVW MEDS BY RX/DR IN RCRD: CPT | Mod: CPTII,,, | Performed by: NURSE PRACTITIONER

## 2023-01-18 PROCEDURE — 1159F MED LIST DOCD IN RCRD: CPT | Mod: CPTII,,, | Performed by: NURSE PRACTITIONER

## 2023-01-18 PROCEDURE — 81001 URINALYSIS AUTO W/SCOPE: CPT | Mod: PBBFAC | Performed by: NURSE PRACTITIONER

## 2023-01-18 PROCEDURE — 81001 URINALYSIS AUTO W/SCOPE: CPT | Performed by: NURSE PRACTITIONER

## 2023-01-18 NOTE — PROGRESS NOTES
"Patient ID: Adelina Rosales is a 57 y.o. female.    Chief Complaint: Well Woman          HPI:  Pt is  (hx of spontaneous  x 4) here for annual gyn exam. Denies hx of abnormal pap. Hx of partial hysterectomy secondary to symptomatic uterine fibroids. Denies vaginal bleeding or discharge. Denies abd/pelvic pain. Denies breast complaints. Pt is followed in medicine clinic for primary care, endocrine for pituitary adenoma (not on meds),and [facility] for  bipolar/depression. States is controlled on meds. Denies SI/HI.Today, pt c/o urinary frequency/vaginal irritation. Denies dysuria. Denies dryness. She is  but states they are not sexually active.   Review of Systems:   Negative except for findings in HPI     Objective:   BP (!) 135/90   Pulse 77   Temp 98.8 °F (37.1 °C)   Resp 16   Ht 5' 7" (1.702 m)   Wt 75.3 kg (166 lb)   SpO2 98%   BMI 26.00 kg/m²    Physical Exam:  GENERAL: Pt is aware and alert and  in no acute distress.  BREASTS: Bilateral-No masses, nipple discharge, skin changes,or tenderness.  ABDOMEN: Soft, non tender.  VULVA:  No lesions or skin changes.  URETHRA: No lesions  BLADDER: No tenderness.  VAGINA: Mucosa atrophic; scant amount of yellow tinged discharge,no  lesions.  CERVIX:  absent  BIMANUAL EXAM: The uterus is absent. Danial adnexa reveal no evidence of masses; no fullness   SKIN: Warm and Dry  PSYCHIATRIC: Patient is awake and alert. Mood and affect are normal.    Assessment:   Women's annual routine gynecological examination    Screening mammogram for breast cancer  -     Mammo Digital Screening Bilat w/ Jaime; Future; Expected date: 2023    Atrophic vaginitis  -     POCT urinalysis, dipstick or tablet reag  -     conjugated estrogens (PREMARIN) vaginal cream; Insert 0.5g vaginally every night for 2 weeks then twice per week  Dispense: 1 applicator; Refill: 3  -     Urinalysis, Reflex to Urine Culture            1. Women's annual routine gynecological " examination    2. Screening mammogram for breast cancer    3. Atrophic vaginitis               -pelvic; pap deferred secondary to hyst for benign conditions  -decrease caffeine intake; begin premarin vaginal ointment for atrophic vaginitis  Plan:       Follow up in about 1 year (around 1/18/2024).

## 2023-01-31 DIAGNOSIS — G47.00 INSOMNIA, UNSPECIFIED TYPE: ICD-10-CM

## 2023-01-31 RX ORDER — HYDROXYZINE PAMOATE 25 MG/1
25 CAPSULE ORAL EVERY 8 HOURS PRN
Qty: 30 CAPSULE | Refills: 3 | Status: SHIPPED | OUTPATIENT
Start: 2023-01-31 | End: 2023-04-17

## 2023-03-30 ENCOUNTER — OFFICE VISIT (OUTPATIENT)
Dept: ENDOCRINOLOGY | Facility: CLINIC | Age: 58
End: 2023-03-30
Payer: MEDICAID

## 2023-03-30 VITALS
SYSTOLIC BLOOD PRESSURE: 120 MMHG | RESPIRATION RATE: 20 BRPM | BODY MASS INDEX: 25.92 KG/M2 | HEART RATE: 80 BPM | HEIGHT: 67 IN | DIASTOLIC BLOOD PRESSURE: 84 MMHG | WEIGHT: 165.13 LBS | TEMPERATURE: 98 F

## 2023-03-30 DIAGNOSIS — R51.9 INCREASED FREQUENCY OF HEADACHES: ICD-10-CM

## 2023-03-30 DIAGNOSIS — E22.1 HYPERPROLACTINEMIA: ICD-10-CM

## 2023-03-30 DIAGNOSIS — D35.2 PITUITARY MICROADENOMA: Primary | ICD-10-CM

## 2023-03-30 LAB
ANION GAP SERPL CALC-SCNC: 4 MEQ/L
BUN SERPL-MCNC: 12.6 MG/DL (ref 9.8–20.1)
CALCIUM SERPL-MCNC: 9 MG/DL (ref 8.4–10.2)
CHLORIDE SERPL-SCNC: 108 MMOL/L (ref 98–107)
CO2 SERPL-SCNC: 26 MMOL/L (ref 23–31)
CORTIS SERPL-SCNC: 9.6 UG/DL
CREAT SERPL-MCNC: 0.76 MG/DL (ref 0.55–1.02)
CREAT/UREA NIT SERPL: 17
GFR SERPLBLD CREATININE-BSD FMLA CKD-EPI: >60 MLS/MIN/1.73/M2
GLUCOSE SERPL-MCNC: 143 MG/DL (ref 82–115)
POTASSIUM SERPL-SCNC: 3.6 MMOL/L (ref 3.5–5.1)
PROLACTIN LEVEL (OHS): 41.25 NG/ML (ref 5.18–26.53)
SODIUM SERPL-SCNC: 138 MMOL/L (ref 136–145)
TSH SERPL-ACNC: 0.5 UIU/ML (ref 0.35–4.94)

## 2023-03-30 PROCEDURE — 99214 OFFICE O/P EST MOD 30 MIN: CPT | Mod: PBBFAC | Performed by: NURSE PRACTITIONER

## 2023-03-30 PROCEDURE — 1159F MED LIST DOCD IN RCRD: CPT | Mod: CPTII,,, | Performed by: NURSE PRACTITIONER

## 2023-03-30 PROCEDURE — 1160F PR REVIEW ALL MEDS BY PRESCRIBER/CLIN PHARMACIST DOCUMENTED: ICD-10-PCS | Mod: CPTII,,, | Performed by: NURSE PRACTITIONER

## 2023-03-30 PROCEDURE — 99214 PR OFFICE/OUTPT VISIT, EST, LEVL IV, 30-39 MIN: ICD-10-PCS | Mod: S$PBB,,, | Performed by: NURSE PRACTITIONER

## 2023-03-30 PROCEDURE — 84146 ASSAY OF PROLACTIN: CPT | Performed by: NURSE PRACTITIONER

## 2023-03-30 PROCEDURE — 84443 ASSAY THYROID STIM HORMONE: CPT | Performed by: NURSE PRACTITIONER

## 2023-03-30 PROCEDURE — 82533 TOTAL CORTISOL: CPT | Performed by: NURSE PRACTITIONER

## 2023-03-30 PROCEDURE — 82024 ASSAY OF ACTH: CPT | Mod: 90 | Performed by: NURSE PRACTITIONER

## 2023-03-30 PROCEDURE — 99214 OFFICE O/P EST MOD 30 MIN: CPT | Mod: S$PBB,,, | Performed by: NURSE PRACTITIONER

## 2023-03-30 PROCEDURE — 3008F PR BODY MASS INDEX (BMI) DOCUMENTED: ICD-10-PCS | Mod: CPTII,,, | Performed by: NURSE PRACTITIONER

## 2023-03-30 PROCEDURE — 1159F PR MEDICATION LIST DOCUMENTED IN MEDICAL RECORD: ICD-10-PCS | Mod: CPTII,,, | Performed by: NURSE PRACTITIONER

## 2023-03-30 PROCEDURE — 84439 ASSAY OF FREE THYROXINE: CPT | Performed by: NURSE PRACTITIONER

## 2023-03-30 PROCEDURE — 80048 BASIC METABOLIC PNL TOTAL CA: CPT | Performed by: NURSE PRACTITIONER

## 2023-03-30 PROCEDURE — 3079F DIAST BP 80-89 MM HG: CPT | Mod: CPTII,,, | Performed by: NURSE PRACTITIONER

## 2023-03-30 PROCEDURE — 3079F PR MOST RECENT DIASTOLIC BLOOD PRESSURE 80-89 MM HG: ICD-10-PCS | Mod: CPTII,,, | Performed by: NURSE PRACTITIONER

## 2023-03-30 PROCEDURE — 36415 COLL VENOUS BLD VENIPUNCTURE: CPT | Performed by: NURSE PRACTITIONER

## 2023-03-30 PROCEDURE — 1160F RVW MEDS BY RX/DR IN RCRD: CPT | Mod: CPTII,,, | Performed by: NURSE PRACTITIONER

## 2023-03-30 PROCEDURE — 3074F SYST BP LT 130 MM HG: CPT | Mod: CPTII,,, | Performed by: NURSE PRACTITIONER

## 2023-03-30 PROCEDURE — 3008F BODY MASS INDEX DOCD: CPT | Mod: CPTII,,, | Performed by: NURSE PRACTITIONER

## 2023-03-30 PROCEDURE — 3074F PR MOST RECENT SYSTOLIC BLOOD PRESSURE < 130 MM HG: ICD-10-PCS | Mod: CPTII,,, | Performed by: NURSE PRACTITIONER

## 2023-03-30 RX ORDER — CABERGOLINE 0.5 MG/1
0.25 TABLET ORAL
Qty: 4 TABLET | Refills: 5 | Status: SHIPPED | OUTPATIENT
Start: 2023-03-30 | End: 2023-08-10

## 2023-03-30 RX ORDER — MELOXICAM 7.5 MG/1
TABLET ORAL
COMMUNITY
Start: 2023-03-20 | End: 2023-08-10

## 2023-03-30 RX ORDER — TIZANIDINE 4 MG/1
4 TABLET ORAL 3 TIMES DAILY
COMMUNITY
Start: 2023-02-26 | End: 2023-06-12

## 2023-03-30 NOTE — PROGRESS NOTES
Subjective     Patient ID: Adelina Rosales is a 58 y.o. female.    Chief Complaint: pituitary microadenoma  (Follow up)    Endocrine clinic note 08/11/2022: 51-year-old female scheduled today as new patient referral to endocrine clinic for pituitary microadenoma and hyperprolactinemia.  Patient had MRI head MRI 06/21/20221. Suggestion of a left pituitary 0.2 cm microadenoma.  Patient previously had a history of migraine headaches but states that time she has a headache that is in the center of her forehead between her eyes has occurred more frequently.  Patient was found to have elevated prolactin level of 70.90 previous prolactin levels were 54.04 and  21.32 years ago.  Patient denies any galactorrhea or breast fullness.  Patient is postmenopausal and has not had cycles and almost 9 years.  Will complete workup no start cabergoline due to elevated prolactin level.    Endocrine clinic note 3/30/2023:  58-year-old female scheduled today for endocrine clinic follow-up.  History of pituitary microadenoma and hyperprolactinemia.  On initial workup prolactin level 23.59, IGF-1 97, cortisol 6.2, acth 9.1, TSH 1.5533, free T4 1.09  Tremayne stim test baseline cortisol 6.2, 1/2 hour 9.1, 1 hour 17.5 (by below cutoff of 18).  Patient had decrease in prolactin level.  We will repeat today to ensure patient's prolactin level is normal.  Patient reports increased amount of headaches that are directly in the front of her head to wean her eyes that has become increasingly worse and happens 5-6 times per week patient denies headaches in any other part of her head patient denies galactorrhea.  MRI 06/21/2022 1. Suggestion of a left pituitary 0.2 cm microadenoma.         Endocrine clinic note 03/30/2023:      Narrative & Impression  EXAMINATION:  MRI BRAIN PITUITARY W W/O CONTRAST     CLINICAL HISTORY:  brain or lesion suspected; elevated prolactin with daily migraines; r/o pituitary adenoma;;Other specified disorders of brain      TECHNIQUE:  Multiplanar, multisequence MR images through the sella were obtained before and after the administration of intravenous gadolinium based contrast.     COMPARISON:  Head CT 04/18/2022.     Brain MRI 05/08/2012.     FINDINGS:  Sella: Normal in size.     Pituitary gland: Left pituitary 0.2 cm focus of relative hypoenhancement (series 15, image 32).  Otherwise normal in height and signal.     Pituitary stalk: At midline.     Optic chiasm: Well visualized, normal in size and signal.     Cavernous sinuses: Normal in size and symmetric.     Clivus: Intact.     Sphenoid Sinuses: No T2 hyperintense inflammatory changes.     Visualized brain parenchyma:     No mass, hemorrhage or acute vascular insults.     Incidental left middle frontal developmental venous anomaly.     Otherwise no enhancing abnormalities.     Ventricles are normal in size and configuration. No hydrocephalus.     No extra-axial masses or fluid collections.     Normal flow voids in the major arteries and veins.     No craniocervical abnormalities.     Impression:     1. Suggestion of a left pituitary 0.2 cm microadenoma.     2. No acute intracranial abnormalities.        Electronically signed by: Christiano Aldridge  Date:                                            06/21/2022  Time:                                           12:36     Exam Ended: 06/21/22 11:55 Last Resulted: 06/21/22 12:36             Review of Systems   Constitutional:  Negative for activity change, appetite change and fatigue.   HENT:  Negative for dental problem, hearing loss, tinnitus, trouble swallowing and goiter.    Eyes:  Negative for photophobia, pain and visual disturbance.   Respiratory:  Negative for cough, chest tightness and wheezing.    Cardiovascular:  Negative for chest pain, palpitations and leg swelling.   Gastrointestinal:  Negative for abdominal pain, constipation, diarrhea, nausea and reflux.   Endocrine: Negative for cold intolerance, heat intolerance,  polydipsia and polyphagia.   Genitourinary:  Negative for difficulty urinating, flank pain, hematuria, hot flashes, menstrual irregularity, menstrual problem, nocturia and urgency.   Musculoskeletal:  Negative for back pain, gait problem, joint swelling, leg pain and joint deformity.   Integumentary:  Negative for color change, pallor, rash and breast discharge.   Allergic/Immunologic: Negative for environmental allergies, food allergies and immunocompromised state.   Neurological:  Positive for headaches. Negative for tremors, seizures, coordination difficulties, memory loss and coordination difficulties.   Psychiatric/Behavioral:  Positive for agitation and sleep disturbance. Negative for behavioral problems. The patient is not nervous/anxious.         Objective     Physical Exam  Constitutional:       General: She is not in acute distress.     Appearance: Normal appearance. She is not ill-appearing.   HENT:      Head: Normocephalic and atraumatic.      Right Ear: External ear normal.      Left Ear: External ear normal.      Nose: Nose normal. No congestion or rhinorrhea.      Mouth/Throat:      Mouth: Mucous membranes are moist.      Pharynx: Oropharynx is clear. No oropharyngeal exudate.   Eyes:      General:         Right eye: No discharge.         Left eye: No discharge.      Conjunctiva/sclera: Conjunctivae normal.      Pupils: Pupils are equal, round, and reactive to light.   Neck:      Thyroid: No thyroid mass, thyromegaly or thyroid tenderness.   Cardiovascular:      Rate and Rhythm: Normal rate and regular rhythm.      Pulses: Normal pulses.      Heart sounds: Normal heart sounds. No murmur heard.  Pulmonary:      Effort: Pulmonary effort is normal. No respiratory distress.      Breath sounds: Normal breath sounds.   Abdominal:      General: Abdomen is flat. Bowel sounds are normal. There is no distension.      Palpations: Abdomen is soft.      Tenderness: There is no abdominal tenderness.    Musculoskeletal:         General: No swelling or tenderness. Normal range of motion.      Cervical back: Normal range of motion and neck supple. No tenderness.      Right lower leg: No edema.      Left lower leg: No edema.   Feet:      Right foot:      Skin integrity: Skin integrity normal.      Left foot:      Skin integrity: Skin integrity normal.   Lymphadenopathy:      Cervical: No cervical adenopathy.   Skin:     General: Skin is warm and dry.      Coloration: Skin is not jaundiced or pale.   Neurological:      General: No focal deficit present.      Mental Status: She is alert and oriented to person, place, and time. Mental status is at baseline.      Coordination: Coordination normal.      Gait: Gait normal.   Psychiatric:         Mood and Affect: Mood normal.         Behavior: Behavior normal.         Thought Content: Thought content normal.          Assessment and Plan     Problem List Items Addressed This Visit    None  Visit Diagnoses       Pituitary microadenoma    -  Primary    Relevant Orders    Prolactin    TSH    T4, Free, Direct Dialysis    Basic Metabolic Panel    Cortisol    ACTH    Increased frequency of headaches              Pituitary microadenoma  On initial workup prolactin level 23.59, IGF-1 97, cortisol 6.2, acth 9.1, TSH 1.5533, free T4 1.09  Tremayne stim test baseline cortisol 6.2, 1/2 hour 9.1, 1 hour 17.5 (by below cutoff of 18)  No cabergoline at this time patient's previous prolactin level normal  Repeat cortisol level today if remains lower repeat Tremayne stim test  Prolactin level, TSH, free T4 by dialysis, cortisol and ACTH today  Return to clinic in 6 months  -     Prolactin; Future; Expected date: 03/30/2023  -     TSH; Future; Expected date: 03/30/2023  -     T4, Free, Direct Dialysis; Future; Expected date: 03/30/2023  -     Basic Metabolic Panel; Future; Expected date: 03/30/2023  -     Cortisol; Future; Expected date: 03/30/2023  -     ACTH; Future; Expected date:  03/30/2023    Component Ref Range & Units 7 mo ago  (8/11/22) 1 yr ago  (12/27/21) 1 yr ago  (5/27/21) 2 yr ago  (7/1/20)   Prolactin Level 5.18 - 26.53 ng/mL 23.59  78.90 High   54.04 High   21.3 R      Component Ref Range & Units 7 mo ago   IGF-1, LC/MS, S 37 - 208 ng/mL 97    Z-score -2.0 - 2.0 SD -0.13      Component Ref Range & Units 7 mo ago   Cortisol Level ug/dL 6.2      Component Ref Range & Units 7 mo ago   Adrenocorticotropic Hormone, P pg/mL 9.1      Component Ref Range & Units 7 mo ago  (8/11/22) 1 yr ago  (12/27/21) 2 yr ago  (2/25/21) 2 yr ago  (7/1/20) 3 yr ago  (5/28/19) 4 yr ago  (2/22/19)   Thyroxine Free 0.70 - 1.48 ng/dL 1.09  0.89  1.04  0.99 R  0.98 R  0.89 R      Component Ref Range & Units 7 mo ago  (8/11/22) 1 yr ago  (12/27/21) 2 yr ago  (2/25/21) 2 yr ago  (7/1/20) 3 yr ago  (5/28/19) 4 yr ago  (2/22/19) 4 yr ago  (7/24/18)   Thyroid Stimulating Hormone 0.3500 - 4.9400 uIU/mL 0.7877  1.5533  3.3180  1.107 R  1.600 R  5.340 High  R  1.310 R        Component Ref Range & Units 7 mo ago  (8/19/22) 7 mo ago  (8/19/22) 7 mo ago  (8/11/22)   Cortisol Level ug/dL 17.5  9.1 CM  6.2 CM      Increased frequency of headaches  Repeat labs start cabergoline if abnormal  If labs are normal we will consider medicine for headaches      I spent a total of 30 minutes on the day of the visit.This includes face to face time and non-face to face time preparing to see the patient (eg, review of tests), obtaining and/or reviewing separately obtained history, documenting clinical information in the electronic or other health record, independently interpreting results and communicating results to the patient/family/caregiver, or care coordinator.

## 2023-03-31 ENCOUNTER — TELEPHONE (OUTPATIENT)
Dept: ENDOCRINOLOGY | Facility: CLINIC | Age: 58
End: 2023-03-31

## 2023-03-31 DIAGNOSIS — F40.240 CLAUSTROPHOBIA: Primary | ICD-10-CM

## 2023-03-31 DIAGNOSIS — D35.2 PITUITARY MICROADENOMA: Primary | ICD-10-CM

## 2023-03-31 LAB — ACTH PLAS-MCNC: 18 PG/ML

## 2023-03-31 RX ORDER — DOXEPIN HYDROCHLORIDE 50 MG/1
50 CAPSULE ORAL NIGHTLY
COMMUNITY
Start: 2023-02-18 | End: 2023-08-10

## 2023-03-31 RX ORDER — DIAZEPAM 10 MG/1
10 TABLET ORAL ONCE
Qty: 1 TABLET | Refills: 0 | Status: SHIPPED | OUTPATIENT
Start: 2023-03-31 | End: 2023-08-08

## 2023-03-31 NOTE — TELEPHONE ENCOUNTER
Pharmacy called to let you know that cabergoline will interact with her Latuda. She needs permission to fill from provider. Please advise. Thanks

## 2023-03-31 NOTE — TELEPHONE ENCOUNTER
----- Message from Lisseth Steen sent at 3/31/2023  9:37 AM CDT -----  Regarding: pt call  Michelle bajwa/[facility] pharmacy is needing to talk to someone regarding meds. [phone]

## 2023-04-13 ENCOUNTER — HOSPITAL ENCOUNTER (OUTPATIENT)
Dept: RADIOLOGY | Facility: HOSPITAL | Age: 58
Discharge: HOME | End: 2023-04-13
Attending: NURSE PRACTITIONER
Payer: MEDICAID

## 2023-04-13 DIAGNOSIS — R51.9 INCREASED FREQUENCY OF HEADACHES: ICD-10-CM

## 2023-04-13 DIAGNOSIS — D35.2 PITUITARY MICROADENOMA: ICD-10-CM

## 2023-04-13 DIAGNOSIS — R51.9 INCREASED FREQUENCY OF HEADACHES: Primary | ICD-10-CM

## 2023-04-13 PROCEDURE — 70553 MRI BRAIN STEM W/O & W/DYE: CPT | Mod: TC

## 2023-04-13 PROCEDURE — 25500020 PHARM REV CODE 255

## 2023-04-13 PROCEDURE — A9577 INJ MULTIHANCE: HCPCS

## 2023-04-13 RX ADMIN — GADOBENATE DIMEGLUMINE 17 ML: 529 INJECTION, SOLUTION INTRAVENOUS at 09:04

## 2023-04-17 ENCOUNTER — LAB VISIT (OUTPATIENT)
Dept: LAB | Facility: HOSPITAL | Age: 58
End: 2023-04-17
Attending: STUDENT IN AN ORGANIZED HEALTH CARE EDUCATION/TRAINING PROGRAM
Payer: MEDICAID

## 2023-04-17 ENCOUNTER — OFFICE VISIT (OUTPATIENT)
Dept: INTERNAL MEDICINE | Facility: CLINIC | Age: 58
End: 2023-04-17
Payer: MEDICAID

## 2023-04-17 VITALS
SYSTOLIC BLOOD PRESSURE: 138 MMHG | TEMPERATURE: 98 F | WEIGHT: 168.44 LBS | HEART RATE: 74 BPM | OXYGEN SATURATION: 99 % | BODY MASS INDEX: 26.37 KG/M2 | RESPIRATION RATE: 20 BRPM | DIASTOLIC BLOOD PRESSURE: 87 MMHG

## 2023-04-17 DIAGNOSIS — I10 HYPERTENSION, UNSPECIFIED TYPE: ICD-10-CM

## 2023-04-17 DIAGNOSIS — E04.2 MULTIPLE THYROID NODULES: ICD-10-CM

## 2023-04-17 DIAGNOSIS — E78.5 HYPERLIPIDEMIA, UNSPECIFIED HYPERLIPIDEMIA TYPE: ICD-10-CM

## 2023-04-17 DIAGNOSIS — E11.9 TYPE 2 DIABETES MELLITUS WITHOUT COMPLICATION, WITHOUT LONG-TERM CURRENT USE OF INSULIN: ICD-10-CM

## 2023-04-17 DIAGNOSIS — R29.818 SUSPECTED SLEEP APNEA: ICD-10-CM

## 2023-04-17 DIAGNOSIS — Z79.899 MEDICATION MANAGEMENT: ICD-10-CM

## 2023-04-17 DIAGNOSIS — G43.709 CHRONIC MIGRAINE WITHOUT AURA WITHOUT STATUS MIGRAINOSUS, NOT INTRACTABLE: Primary | ICD-10-CM

## 2023-04-17 DIAGNOSIS — K21.9 GASTROESOPHAGEAL REFLUX DISEASE, UNSPECIFIED WHETHER ESOPHAGITIS PRESENT: ICD-10-CM

## 2023-04-17 DIAGNOSIS — G47.00 INSOMNIA, UNSPECIFIED TYPE: ICD-10-CM

## 2023-04-17 DIAGNOSIS — D35.2 PITUITARY ADENOMA: ICD-10-CM

## 2023-04-17 DIAGNOSIS — Z01.89 ROUTINE LAB DRAW: ICD-10-CM

## 2023-04-17 DIAGNOSIS — D35.2 PITUITARY MICROADENOMA: ICD-10-CM

## 2023-04-17 DIAGNOSIS — R73.03 PREDIABETES: ICD-10-CM

## 2023-04-17 DIAGNOSIS — E22.1 HYPERPROLACTINEMIA: ICD-10-CM

## 2023-04-17 LAB
ALBUMIN SERPL-MCNC: 4.3 G/DL (ref 3.4–4.8)
ALBUMIN/GLOB SERPL: 1.3 RATIO (ref 1.1–2)
ALP SERPL-CCNC: 73 UNIT/L (ref 40–150)
ALT SERPL-CCNC: 14 UNIT/L (ref 0–55)
AST SERPL-CCNC: 20 UNIT/L (ref 5–34)
BASOPHILS # BLD AUTO: 0.03 X10(3)/MCL (ref 0–0.2)
BASOPHILS NFR BLD AUTO: 0.5 %
BILIRUBIN DIRECT+TOT PNL SERPL-MCNC: 0.7 MG/DL
BUN SERPL-MCNC: 14.5 MG/DL (ref 9.8–20.1)
CALCIUM SERPL-MCNC: 9.5 MG/DL (ref 8.4–10.2)
CHLORIDE SERPL-SCNC: 106 MMOL/L (ref 98–107)
CHOLEST SERPL-MCNC: 186 MG/DL
CHOLEST/HDLC SERPL: 4 {RATIO} (ref 0–5)
CO2 SERPL-SCNC: 25 MMOL/L (ref 23–31)
CORTIS SERPL-SCNC: 7.8 UG/DL
CREAT SERPL-MCNC: 0.87 MG/DL (ref 0.55–1.02)
DEPRECATED CALCIDIOL+CALCIFEROL SERPL-MC: 43.4 NG/ML (ref 30–80)
EOSINOPHIL # BLD AUTO: 0.16 X10(3)/MCL (ref 0–0.9)
EOSINOPHIL NFR BLD AUTO: 2.8 %
ERYTHROCYTE [DISTWIDTH] IN BLOOD BY AUTOMATED COUNT: 11.9 % (ref 11.5–17)
EST. AVERAGE GLUCOSE BLD GHB EST-MCNC: 108.3 MG/DL
GFR SERPLBLD CREATININE-BSD FMLA CKD-EPI: >60 MLS/MIN/1.73/M2
GLOBULIN SER-MCNC: 3.2 GM/DL (ref 2.4–3.5)
GLUCOSE SERPL-MCNC: 115 MG/DL (ref 82–115)
HBA1C MFR BLD: 5.4 %
HCT VFR BLD AUTO: 37.9 % (ref 37–47)
HDLC SERPL-MCNC: 49 MG/DL (ref 35–60)
HGB BLD-MCNC: 13.1 G/DL (ref 12–16)
IMM GRANULOCYTES # BLD AUTO: 0.01 X10(3)/MCL (ref 0–0.04)
IMM GRANULOCYTES NFR BLD AUTO: 0.2 %
LDLC SERPL CALC-MCNC: 115 MG/DL (ref 50–140)
LYMPHOCYTES # BLD AUTO: 1.42 X10(3)/MCL (ref 0.6–4.6)
LYMPHOCYTES NFR BLD AUTO: 25 %
MCH RBC QN AUTO: 30.9 PG (ref 27–31)
MCHC RBC AUTO-ENTMCNC: 34.6 G/DL (ref 33–36)
MCV RBC AUTO: 89.4 FL (ref 80–94)
MONOCYTES # BLD AUTO: 0.35 X10(3)/MCL (ref 0.1–1.3)
MONOCYTES NFR BLD AUTO: 6.2 %
NEUTROPHILS # BLD AUTO: 3.71 X10(3)/MCL (ref 2.1–9.2)
NEUTROPHILS NFR BLD AUTO: 65.3 %
NRBC BLD AUTO-RTO: 0 %
PLATELET # BLD AUTO: 277 X10(3)/MCL (ref 130–400)
PMV BLD AUTO: 9.5 FL (ref 7.4–10.4)
POTASSIUM SERPL-SCNC: 3.8 MMOL/L (ref 3.5–5.1)
PROLACTIN LEVEL (OHS): 110.83 NG/ML (ref 5.18–26.53)
PROT SERPL-MCNC: 7.5 GM/DL (ref 5.8–7.6)
RBC # BLD AUTO: 4.24 X10(6)/MCL (ref 4.2–5.4)
SODIUM SERPL-SCNC: 141 MMOL/L (ref 136–145)
TRIGL SERPL-MCNC: 111 MG/DL (ref 37–140)
TSH SERPL-ACNC: 0.8 UIU/ML (ref 0.35–4.94)
VLDLC SERPL CALC-MCNC: 22 MG/DL
WBC # SPEC AUTO: 5.7 X10(3)/MCL (ref 4.5–11.5)

## 2023-04-17 PROCEDURE — 80053 COMPREHEN METABOLIC PANEL: CPT

## 2023-04-17 PROCEDURE — 99213 OFFICE O/P EST LOW 20 MIN: CPT | Mod: PBBFAC

## 2023-04-17 PROCEDURE — 80061 LIPID PANEL: CPT

## 2023-04-17 PROCEDURE — 83036 HEMOGLOBIN GLYCOSYLATED A1C: CPT

## 2023-04-17 PROCEDURE — 85025 COMPLETE CBC W/AUTO DIFF WBC: CPT

## 2023-04-17 PROCEDURE — 36415 COLL VENOUS BLD VENIPUNCTURE: CPT

## 2023-04-17 PROCEDURE — 84146 ASSAY OF PROLACTIN: CPT

## 2023-04-17 PROCEDURE — 84443 ASSAY THYROID STIM HORMONE: CPT

## 2023-04-17 PROCEDURE — 82306 VITAMIN D 25 HYDROXY: CPT

## 2023-04-17 RX ORDER — DEXAMETHASONE 1 MG/1
TABLET ORAL
Qty: 1 TABLET | Refills: 0 | Status: SHIPPED | OUTPATIENT
Start: 2023-04-17 | End: 2023-08-10

## 2023-04-17 RX ORDER — SUMATRIPTAN 50 MG/1
50 TABLET, FILM COATED ORAL DAILY PRN
Qty: 90 TABLET | Refills: 3 | Status: SHIPPED | OUTPATIENT
Start: 2023-04-17 | End: 2023-12-15 | Stop reason: SDUPTHER

## 2023-04-17 RX ORDER — OMEPRAZOLE 20 MG/1
40 CAPSULE, DELAYED RELEASE ORAL DAILY
Qty: 60 CAPSULE | Refills: 3 | Status: SHIPPED | OUTPATIENT
Start: 2023-04-17 | End: 2023-08-16 | Stop reason: SDUPTHER
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
## 2023-04-18 NOTE — TELEPHONE ENCOUNTER
Patient notified of need to do Dex. Suppression test. Instructions given. Patient verbalized understanding.

## 2023-04-20 ENCOUNTER — HOSPITAL ENCOUNTER (OUTPATIENT)
Dept: RADIOLOGY | Facility: HOSPITAL | Age: 58
Discharge: HOME | End: 2023-04-20
Attending: STUDENT IN AN ORGANIZED HEALTH CARE EDUCATION/TRAINING PROGRAM
Payer: MEDICARE

## 2023-04-20 DIAGNOSIS — E04.2 MULTIPLE THYROID NODULES: ICD-10-CM

## 2023-04-20 PROCEDURE — 76536 US EXAM OF HEAD AND NECK: CPT | Mod: TC

## 2023-04-20 RX ORDER — FLUTICASONE PROPIONATE 50 MCG
1 SPRAY, SUSPENSION (ML) NASAL 2 TIMES DAILY
Qty: 16 G | Refills: 1 | Status: SHIPPED | OUTPATIENT
Start: 2023-04-20 | End: 2023-08-10 | Stop reason: SDUPTHER

## 2023-04-20 NOTE — TELEPHONE ENCOUNTER
----- Message from Pooja Villa sent at 4/20/2023  7:14 AM CDT -----  Regarding: Jose-- Medication Refills  Refill Request    Provider: Jose     Last Visit: 04/17/2023    Next Visit: not scheduled yet    Patient's Contact #: [phone]    Medication Name & Dose: Flonase     Preferred Pharmacy: [facility] Yeung     Comment:       Thanks for all that you do,  Pooja

## 2023-04-21 ENCOUNTER — LAB VISIT (OUTPATIENT)
Dept: LAB | Facility: HOSPITAL | Age: 58
End: 2023-04-21
Attending: NURSE PRACTITIONER
Payer: MEDICAID

## 2023-04-21 DIAGNOSIS — D35.2 PITUITARY MICROADENOMA: ICD-10-CM

## 2023-04-21 LAB — CORTIS SERPL-SCNC: 1.1 UG/DL

## 2023-04-21 PROCEDURE — 36415 COLL VENOUS BLD VENIPUNCTURE: CPT

## 2023-04-21 PROCEDURE — 82533 TOTAL CORTISOL: CPT

## 2023-04-21 PROCEDURE — 82024 ASSAY OF ACTH: CPT | Mod: 90

## 2023-04-21 PROCEDURE — 80299 QUANTITATIVE ASSAY DRUG: CPT

## 2023-04-24 DIAGNOSIS — R79.89 ABNORMAL CORTISOL LEVEL: Primary | ICD-10-CM

## 2023-04-24 LAB — ACTH PLAS-MCNC: <5 PG/ML

## 2023-04-26 ENCOUNTER — APPOINTMENT (OUTPATIENT)
Dept: LAB | Facility: HOSPITAL | Age: 58
End: 2023-04-26
Attending: NURSE PRACTITIONER
Payer: MEDICAID

## 2023-04-28 LAB
CORTIS SAL-MCNC: NORMAL UG/DL
CORTIS SAL-MCNC: NORMAL UG/DL
MAYO GENERIC ORDERABLE RESULT: NORMAL
MAYO GENERIC ORDERABLE RESULT: NORMAL

## 2023-05-03 DIAGNOSIS — R79.89 ABNORMAL CORTISOL LEVEL: Primary | ICD-10-CM

## 2023-05-11 ENCOUNTER — APPOINTMENT (OUTPATIENT)
Dept: LAB | Facility: HOSPITAL | Age: 58
End: 2023-05-11
Attending: NURSE PRACTITIONER
Payer: MEDICAID

## 2023-05-11 PROCEDURE — 82533 TOTAL CORTISOL: CPT | Mod: 91,90 | Performed by: NURSE PRACTITIONER

## 2023-05-16 LAB
CORTIS 12 AM SAL-MCNC: 50 NG/DL
CORTIS 12 AM SAL-MCNC: 56 NG/DL

## 2023-06-12 ENCOUNTER — HOSPITAL ENCOUNTER (EMERGENCY)
Facility: HOSPITAL | Age: 58
Discharge: HOME | End: 2023-06-12
Attending: EMERGENCY MEDICINE
Payer: MEDICARE

## 2023-06-12 VITALS
SYSTOLIC BLOOD PRESSURE: 130 MMHG | TEMPERATURE: 98 F | BODY MASS INDEX: 25.48 KG/M2 | HEART RATE: 94 BPM | WEIGHT: 162.38 LBS | OXYGEN SATURATION: 98 % | HEIGHT: 67 IN | RESPIRATION RATE: 20 BRPM | DIASTOLIC BLOOD PRESSURE: 88 MMHG

## 2023-06-12 DIAGNOSIS — Z76.0 MEDICATION REFILL: ICD-10-CM

## 2023-06-12 DIAGNOSIS — F41.9 ANXIETY: Primary | ICD-10-CM

## 2023-06-12 PROCEDURE — 99282 EMERGENCY DEPT VISIT SF MDM: CPT

## 2023-06-12 RX ORDER — TIZANIDINE 4 MG/1
4 TABLET ORAL EVERY 8 HOURS
Qty: 30 TABLET | Refills: 0 | Status: SHIPPED | OUTPATIENT
Start: 2023-06-12 | End: 2023-06-22

## 2023-06-12 RX ORDER — THIORIDAZINE HYDROCHLORIDE 100 MG/1
100 TABLET, FILM COATED ORAL NIGHTLY
Qty: 14 TABLET | Refills: 0 | Status: SHIPPED | OUTPATIENT
Start: 2023-06-12 | End: 2023-12-15
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
## 2023-07-28 ENCOUNTER — HOSPITAL ENCOUNTER (OUTPATIENT)
Dept: RADIOLOGY | Facility: HOSPITAL | Age: 58
Discharge: HOME | End: 2023-07-28
Attending: NURSE PRACTITIONER
Payer: MEDICARE

## 2023-07-28 DIAGNOSIS — Z12.31 SCREENING MAMMOGRAM FOR BREAST CANCER: ICD-10-CM

## 2023-07-28 PROCEDURE — 77067 MAMMO DIGITAL SCREENING BILAT WITH TOMO: ICD-10-PCS | Mod: 26,,, | Performed by: RADIOLOGY

## 2023-07-28 PROCEDURE — 77067 SCR MAMMO BI INCL CAD: CPT | Mod: 26,,, | Performed by: RADIOLOGY

## 2023-07-28 PROCEDURE — 77063 MAMMO DIGITAL SCREENING BILAT WITH TOMO: ICD-10-PCS | Mod: 26,,, | Performed by: RADIOLOGY

## 2023-07-28 PROCEDURE — 77067 SCR MAMMO BI INCL CAD: CPT | Mod: TC

## 2023-07-28 PROCEDURE — 77063 BREAST TOMOSYNTHESIS BI: CPT | Mod: 26,,, | Performed by: RADIOLOGY

## 2023-08-08 ENCOUNTER — TELEPHONE (OUTPATIENT)
Dept: INTERNAL MEDICINE | Facility: CLINIC | Age: 58
End: 2023-08-08
Payer: MEDICARE

## 2023-08-08 RX ORDER — TIZANIDINE 2 MG/1
1 TABLET ORAL 3 TIMES DAILY PRN
COMMUNITY
End: 2023-09-18 | Stop reason: SDUPTHER

## 2023-08-08 RX ORDER — ZOLPIDEM TARTRATE 5 MG/1
1 TABLET ORAL NIGHTLY
COMMUNITY
End: 2023-08-10

## 2023-08-08 RX ORDER — ALPRAZOLAM 1 MG/1
1 TABLET ORAL NIGHTLY
COMMUNITY
End: 2023-08-10
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
## 2023-08-10 ENCOUNTER — OFFICE VISIT (OUTPATIENT)
Dept: NEUROLOGY | Facility: CLINIC | Age: 58
End: 2023-08-10
Payer: MEDICARE

## 2023-08-10 ENCOUNTER — OFFICE VISIT (OUTPATIENT)
Dept: INTERNAL MEDICINE | Facility: CLINIC | Age: 58
End: 2023-08-10
Payer: MEDICARE

## 2023-08-10 VITALS
BODY MASS INDEX: 26.84 KG/M2 | DIASTOLIC BLOOD PRESSURE: 86 MMHG | SYSTOLIC BLOOD PRESSURE: 132 MMHG | WEIGHT: 171 LBS | HEART RATE: 78 BPM | HEIGHT: 67 IN | OXYGEN SATURATION: 99 %

## 2023-08-10 VITALS
TEMPERATURE: 98 F | DIASTOLIC BLOOD PRESSURE: 84 MMHG | WEIGHT: 171.38 LBS | BODY MASS INDEX: 26.85 KG/M2 | SYSTOLIC BLOOD PRESSURE: 124 MMHG | OXYGEN SATURATION: 99 % | RESPIRATION RATE: 20 BRPM | HEART RATE: 76 BPM

## 2023-08-10 DIAGNOSIS — N18.2 CKD (CHRONIC KIDNEY DISEASE) STAGE 2, GFR 60-89 ML/MIN: ICD-10-CM

## 2023-08-10 DIAGNOSIS — J30.9 ALLERGIC RHINITIS, UNSPECIFIED SEASONALITY, UNSPECIFIED TRIGGER: Primary | ICD-10-CM

## 2023-08-10 DIAGNOSIS — R51.9 INCREASED FREQUENCY OF HEADACHES: ICD-10-CM

## 2023-08-10 DIAGNOSIS — G43.709 CHRONIC MIGRAINE WITHOUT AURA, NOT INTRACTABLE, WITHOUT STATUS MIGRAINOSUS: Primary | ICD-10-CM

## 2023-08-10 DIAGNOSIS — I10 HYPERTENSION, UNSPECIFIED TYPE: ICD-10-CM

## 2023-08-10 PROBLEM — D64.9 NORMOCYTIC ANEMIA: Status: RESOLVED | Noted: 2022-12-27 | Resolved: 2023-08-10

## 2023-08-10 PROBLEM — R73.03 PREDIABETES: Status: RESOLVED | Noted: 2022-12-27 | Resolved: 2023-08-10

## 2023-08-10 PROCEDURE — 99214 OFFICE O/P EST MOD 30 MIN: CPT | Mod: PBBFAC,27 | Performed by: NURSE PRACTITIONER

## 2023-08-10 PROCEDURE — 99205 OFFICE O/P NEW HI 60 MIN: CPT | Mod: S$PBB,,, | Performed by: NURSE PRACTITIONER

## 2023-08-10 PROCEDURE — 99205 PR OFFICE/OUTPT VISIT, NEW, LEVL V, 60-74 MIN: ICD-10-PCS | Mod: S$PBB,,, | Performed by: NURSE PRACTITIONER

## 2023-08-10 PROCEDURE — 99213 OFFICE O/P EST LOW 20 MIN: CPT | Mod: PBBFAC,27 | Performed by: STUDENT IN AN ORGANIZED HEALTH CARE EDUCATION/TRAINING PROGRAM

## 2023-08-10 RX ORDER — FLUTICASONE PROPIONATE 50 MCG
1 SPRAY, SUSPENSION (ML) NASAL 2 TIMES DAILY
Qty: 16 G | Refills: 11 | Status: SHIPPED | OUTPATIENT
Start: 2023-08-10

## 2023-08-10 RX ORDER — CLONAZEPAM 1 MG/1
1 TABLET ORAL DAILY
COMMUNITY

## 2023-08-10 NOTE — PROGRESS NOTES
Attending Addendum:   Patient seen and examined in clinic. Management and Plan were discussed with resident. Care was reasonable and necessary.   Nanda Ritchie MD  [facility] Internal Medicine
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
## 2023-08-10 NOTE — PATIENT INSTRUCTIONS
Decrease Aleve to 1 tab daily x 1 week then stop  Call [phone] and ask for Aide Ricks NP-C (neurology) for migraine that lasts more than 24 hours and medication not helping.   
same name as above

## 2023-08-11 ENCOUNTER — TELEPHONE (OUTPATIENT)
Dept: NEUROLOGY | Facility: CLINIC | Age: 58
End: 2023-08-11
Payer: MEDICARE

## 2023-08-11 NOTE — TELEPHONE ENCOUNTER
----- Message from BROWN Aceves sent at 8/11/2023  7:45 AM CDT -----  Can you please call this patient and let her know the labs that were drawn yesterday are normal. No inflammation  ----- Message -----  From: Lab, Background User  Sent: 8/10/2023   2:13 PM CDT  To: BROWN Aceves

## 2023-08-16 DIAGNOSIS — K21.9 GASTROESOPHAGEAL REFLUX DISEASE, UNSPECIFIED WHETHER ESOPHAGITIS PRESENT: ICD-10-CM

## 2023-08-16 RX ORDER — OMEPRAZOLE 20 MG/1
40 CAPSULE, DELAYED RELEASE ORAL DAILY
Qty: 60 CAPSULE | Refills: 3 | Status: SHIPPED | OUTPATIENT
Start: 2023-08-16 | End: 2024-01-03 | Stop reason: SDUPTHER

## 2023-09-01 ENCOUNTER — PATIENT MESSAGE (OUTPATIENT)
Dept: ADMINISTRATIVE | Facility: HOSPITAL | Age: 58
End: 2023-09-01
Payer: MEDICARE

## 2023-09-18 RX ORDER — TIZANIDINE 2 MG/1
2 TABLET ORAL 3 TIMES DAILY PRN
Qty: 30 TABLET | Refills: 11 | Status: SHIPPED | OUTPATIENT
Start: 2023-09-18 | End: 2023-12-15 | Stop reason: SDUPTHER

## 2023-10-12 ENCOUNTER — OFFICE VISIT (OUTPATIENT)
Dept: ENDOCRINOLOGY | Facility: CLINIC | Age: 58
End: 2023-10-12
Payer: MEDICARE

## 2023-10-12 VITALS
HEART RATE: 61 BPM | RESPIRATION RATE: 18 BRPM | BODY MASS INDEX: 26.32 KG/M2 | TEMPERATURE: 99 F | DIASTOLIC BLOOD PRESSURE: 84 MMHG | SYSTOLIC BLOOD PRESSURE: 129 MMHG | WEIGHT: 167.69 LBS | HEIGHT: 67 IN

## 2023-10-12 DIAGNOSIS — E22.9 PITUITARY MICROADENOMA WITH HYPERPROLACTINEMIA: Primary | ICD-10-CM

## 2023-10-12 DIAGNOSIS — E04.2 MULTINODULAR GOITER: ICD-10-CM

## 2023-10-12 DIAGNOSIS — D35.2 PITUITARY MICROADENOMA WITH HYPERPROLACTINEMIA: Primary | ICD-10-CM

## 2023-10-12 LAB — PROLACTIN LEVEL (OHS): 112.86 NG/ML (ref 5.18–26.53)

## 2023-10-12 PROCEDURE — 3008F PR BODY MASS INDEX (BMI) DOCUMENTED: ICD-10-PCS | Mod: CPTII,,, | Performed by: NURSE PRACTITIONER

## 2023-10-12 PROCEDURE — 1159F PR MEDICATION LIST DOCUMENTED IN MEDICAL RECORD: ICD-10-PCS | Mod: CPTII,,, | Performed by: NURSE PRACTITIONER

## 2023-10-12 PROCEDURE — 3079F DIAST BP 80-89 MM HG: CPT | Mod: CPTII,,, | Performed by: NURSE PRACTITIONER

## 2023-10-12 PROCEDURE — 84146 ASSAY OF PROLACTIN: CPT | Performed by: NURSE PRACTITIONER

## 2023-10-12 PROCEDURE — 3008F BODY MASS INDEX DOCD: CPT | Mod: CPTII,,, | Performed by: NURSE PRACTITIONER

## 2023-10-12 PROCEDURE — 99214 OFFICE O/P EST MOD 30 MIN: CPT | Mod: PBBFAC | Performed by: NURSE PRACTITIONER

## 2023-10-12 PROCEDURE — 1160F PR REVIEW ALL MEDS BY PRESCRIBER/CLIN PHARMACIST DOCUMENTED: ICD-10-PCS | Mod: CPTII,,, | Performed by: NURSE PRACTITIONER

## 2023-10-12 PROCEDURE — 36415 COLL VENOUS BLD VENIPUNCTURE: CPT | Performed by: NURSE PRACTITIONER

## 2023-10-12 PROCEDURE — 3079F PR MOST RECENT DIASTOLIC BLOOD PRESSURE 80-89 MM HG: ICD-10-PCS | Mod: CPTII,,, | Performed by: NURSE PRACTITIONER

## 2023-10-12 PROCEDURE — 99214 OFFICE O/P EST MOD 30 MIN: CPT | Mod: S$PBB,,, | Performed by: NURSE PRACTITIONER

## 2023-10-12 PROCEDURE — 84146 ASSAY OF PROLACTIN: CPT | Mod: 59

## 2023-10-12 PROCEDURE — 99214 PR OFFICE/OUTPT VISIT, EST, LEVL IV, 30-39 MIN: ICD-10-PCS | Mod: S$PBB,,, | Performed by: NURSE PRACTITIONER

## 2023-10-12 PROCEDURE — 3074F SYST BP LT 130 MM HG: CPT | Mod: CPTII,,, | Performed by: NURSE PRACTITIONER

## 2023-10-12 PROCEDURE — 3044F HG A1C LEVEL LT 7.0%: CPT | Mod: CPTII,,, | Performed by: NURSE PRACTITIONER

## 2023-10-12 PROCEDURE — 3044F PR MOST RECENT HEMOGLOBIN A1C LEVEL <7.0%: ICD-10-PCS | Mod: CPTII,,, | Performed by: NURSE PRACTITIONER

## 2023-10-12 PROCEDURE — 3074F PR MOST RECENT SYSTOLIC BLOOD PRESSURE < 130 MM HG: ICD-10-PCS | Mod: CPTII,,, | Performed by: NURSE PRACTITIONER

## 2023-10-12 PROCEDURE — 1159F MED LIST DOCD IN RCRD: CPT | Mod: CPTII,,, | Performed by: NURSE PRACTITIONER

## 2023-10-12 PROCEDURE — 1160F RVW MEDS BY RX/DR IN RCRD: CPT | Mod: CPTII,,, | Performed by: NURSE PRACTITIONER

## 2023-10-12 RX ORDER — CABERGOLINE 0.5 MG/1
0.25 TABLET ORAL
Qty: 8 TABLET | Refills: 2 | Status: SHIPPED | OUTPATIENT
Start: 2023-10-12 | End: 2024-10-11

## 2023-10-12 NOTE — PROGRESS NOTES
Subjective     Patient ID: Adelina Rosales is a 58 y.o. female.    Chief Complaint: Pituitary Microadenoma    Endocrine clinic note 08/11/2022: 51-year-old female scheduled today as new patient referral to endocrine clinic for pituitary microadenoma and hyperprolactinemia.  Patient had MRI head MRI 06/21/20221. Suggestion of a left pituitary 0.2 cm microadenoma.  Patient previously had a history of migraine headaches but states that time she has a headache that is in the center of her forehead between her eyes has occurred more frequently.  Patient was found to have elevated prolactin level of 70.90 previous prolactin levels were 54.04 and  21.32 years ago.  Patient denies any galactorrhea or breast fullness.  Patient is postmenopausal and has not had cycles and almost 9 years.  Will complete workup to start cabergoline due to elevated prolactin level.     Endocrine clinic note 3/30/2023:  58-year-old female scheduled today for endocrine clinic follow-up.  History of pituitary microadenoma and hyperprolactinemia.  On initial workup prolactin level 23.59, IGF-1 97, cortisol 6.2, acth 9.1, TSH 1.5533, free T4 1.09  Tremayne stim test baseline cortisol 6.2, 1/2 hour 9.1, 1 hour 17.5 (by below cutoff of 18).  Patient had decrease in prolactin level.  We will repeat today to ensure patient's prolactin level is normal.  Patient reports increased amount of headaches that are directly in the front of her head to wean her eyes that has become increasingly worse and happens 5-6 times per week patient denies headaches in any other part of her head patient denies galactorrhea.  MRI 06/21/2022 1. Suggestion of a left pituitary 0.2 cm microadenoma.     Endocrine clinic note 10/12/2023:  58-year-old female scheduled today for endocrine clinic follow-up.  History of pituitary microadenoma and hyperprolactinemia. On initial workup prolactin level 23.59, IGF-1 97, cortisol 6.2, acth 9.1, TSH 1.5533, free T4 1.09, Tremayne stim test  baseline cortisol 6.2, 1/2 hour 9.1, 1 hour 17.5 (by below cutoff of 18), Repeat natasha stem 9.1, 17.5 and 1 hour 22.7 on 08/19/2022. Night time salivary normal x 2 on 05/03/2023, Dex suppression test cortisol 1.1 on 04/21/2023, Prolactin 110.83 on 04/17/2023 pt also on Mellaril and Latuda giving , mixed picture if related to pituitary microadenoma or the patient's medications.  Patient was previously having headaches referred to neuro clinic for treatment of headaches, patient is currently being treated by neuro for her headaches.  Patient denies galactorrhea. Multinodular goiter US 04/20/2023 largest nodule 1.1 cm x 8 mm x 1 cm Tir-rads 4.  Patient denies any palpable nodules.            Endocrine clinic note 03/30/2023:      Narrative & Impression  EXAMINATION:  MRI BRAIN PITUITARY W W/O CONTRAST     CLINICAL HISTORY:  brain or lesion suspected; elevated prolactin with daily migraines; r/o pituitary adenoma;;Other specified disorders of brain     TECHNIQUE:  Multiplanar, multisequence MR images through the sella were obtained before and after the administration of intravenous gadolinium based contrast.     COMPARISON:  Head CT 04/18/2022.     Brain MRI 05/08/2012.     FINDINGS:  Sella: Normal in size.     Pituitary gland: Left pituitary 0.2 cm focus of relative hypoenhancement (series 15, image 32).  Otherwise normal in height and signal.     Pituitary stalk: At midline.     Optic chiasm: Well visualized, normal in size and signal.     Cavernous sinuses: Normal in size and symmetric.     Clivus: Intact.     Sphenoid Sinuses: No T2 hyperintense inflammatory changes.     Visualized brain parenchyma:     No mass, hemorrhage or acute vascular insults.     Incidental left middle frontal developmental venous anomaly.     Otherwise no enhancing abnormalities.     Ventricles are normal in size and configuration. No hydrocephalus.     No extra-axial masses or fluid collections.     Normal flow voids in the major arteries  and veins.     No craniocervical abnormalities.     Impression:     1. Suggestion of a left pituitary 0.2 cm microadenoma.     2. No acute intracranial abnormalities.        Electronically signed by: Christiano Aldridge  Date:                                            06/21/2022  Time:                                           12:36     Exam Ended: 06/21/22 11:55 Last Resulted: 06/21/22 12:36      Result Notes  Details      Reading Physician Reading Date Result Priority  Jovanny Lucero MD  [phone] 4/20/2023 Routine    Narrative & Impression  EXAMINATION:  US THYROID     CLINICAL HISTORY:  , Nontoxic multinodular goiter.     TECHNIQUE:  Multiple transverse and sagittal grayscale sonographic images were acquired through the thyroid gland.     COMPARISON:  March 23, 2021     FINDINGS:  Examination reveals the right hemithyroid to measure 4.4 x 1.7 x 1.8 cm, left adarsh thyroid measures 4.4 x 1.2 x 1.4 cm isthmus measures 1.9 mm in thickness.     There is heterogenicity of the thyroid parenchyma some subcentimeter nodules are identified on the right largest measuring approximately 1.1 by 8 mm x 1 cm solid and hypoechoic and corresponds to a TI-RADS type 4 nodule biopsy recommended if greater than 1.5 cm follow-up recommended if greater than 1 cm at 1 year, 2 years, 3 years and 5 years.     There is some heterogenicity of the left adarsh thyroid with no significant not Brendon T is identified     Impression:     Subcentimeter nodules in the right hemithyroid do not meet criteria for biopsy or surveillance.     Largest nodule in the right hemithyroid with recommendations as above.     Heterogenicity of the thyroid parenchyma        Electronically signed by: Jovanny Lucero  Date:                                            04/20/2023  Time:                                           12:42        Exam Ended: 04/20/23 09:34 Last Resulted: 04/20/23 12:42                Review of Systems   Constitutional:  Negative for  activity change, appetite change and fatigue.   HENT:  Negative for dental problem, hearing loss, tinnitus, trouble swallowing and goiter.    Eyes:  Negative for photophobia, pain and visual disturbance.   Respiratory:  Negative for cough, chest tightness and wheezing.    Cardiovascular:  Negative for chest pain, palpitations and leg swelling.   Gastrointestinal:  Negative for abdominal pain, constipation, diarrhea, nausea and reflux.   Endocrine: Negative for cold intolerance, heat intolerance, polydipsia and polyphagia.   Genitourinary:  Negative for difficulty urinating, flank pain, hematuria, hot flashes, menstrual irregularity, menstrual problem, nocturia and urgency.   Musculoskeletal:  Negative for back pain, gait problem, joint swelling, leg pain and joint deformity.   Integumentary:  Negative for color change, pallor, rash and breast discharge.   Allergic/Immunologic: Negative for environmental allergies, food allergies and immunocompromised state.   Neurological:  Positive for headaches. Negative for tremors, seizures, memory loss and coordination difficulties.   Psychiatric/Behavioral:  Positive for dysphoric mood. Negative for agitation, behavioral problems and sleep disturbance. The patient is not nervous/anxious.         Depression           Objective     Physical Exam  Constitutional:       General: She is not in acute distress.     Appearance: Normal appearance. She is not ill-appearing.   HENT:      Head: Normocephalic and atraumatic.      Right Ear: External ear normal.      Left Ear: External ear normal.      Nose: Nose normal. No congestion or rhinorrhea.      Mouth/Throat:      Mouth: Mucous membranes are moist.      Pharynx: Oropharynx is clear. No oropharyngeal exudate.   Eyes:      General:         Right eye: No discharge.         Left eye: No discharge.      Conjunctiva/sclera: Conjunctivae normal.      Pupils: Pupils are equal, round, and reactive to light.   Neck:      Thyroid: No thyroid  mass, thyromegaly or thyroid tenderness.   Cardiovascular:      Rate and Rhythm: Normal rate and regular rhythm.      Pulses: Normal pulses.      Heart sounds: Normal heart sounds. No murmur heard.  Pulmonary:      Effort: Pulmonary effort is normal. No respiratory distress.      Breath sounds: Normal breath sounds.   Abdominal:      General: Abdomen is flat. Bowel sounds are normal. There is no distension.      Palpations: Abdomen is soft.      Tenderness: There is no abdominal tenderness.   Musculoskeletal:         General: No swelling or tenderness. Normal range of motion.      Cervical back: Normal range of motion and neck supple. No tenderness.      Right lower leg: No edema.      Left lower leg: No edema.   Feet:      Right foot:      Skin integrity: Skin integrity normal.      Left foot:      Skin integrity: Skin integrity normal.   Lymphadenopathy:      Cervical: No cervical adenopathy.   Skin:     General: Skin is warm and dry.      Coloration: Skin is not jaundiced or pale.   Neurological:      General: No focal deficit present.      Mental Status: She is alert and oriented to person, place, and time. Mental status is at baseline.      Coordination: Coordination normal.      Gait: Gait normal.   Psychiatric:         Behavior: Behavior normal.         Thought Content: Thought content normal.      Comments: Teary eyed            Assessment and Plan     1. Pituitary microadenoma with hyperprolactinemia  On initial workup prolactin level 23.59, IGF-1 97, cortisol 6.2, acth 9.1, TSH 1.5533, free T4 1.09  Natasha stim test baseline cortisol 6.2, 1/2 hour 9.1, 1 hour 17.5 (by below cutoff of 18)  Repeat natasha stem 9.1, 17.5 and 1 hour 22.7 on 08/19/2022   Night time salivary normal x 2 on 05/03/2023   Dex suppression test cortisol 1.1 on 04/21/2023  Prolactin 110.83 on 04/17/2023 pt also on Mellaril and Latuda   Patient has not been on cabergoline has not started  Component Ref Range & Units 5 mo ago  (4/17/23) 6 mo  ago  (3/30/23) 1 yr ago  (8/11/22) 1 yr ago  (12/27/21) 2 yr ago  (5/27/21) 3 yr ago  (7/1/20)   Prolactin Level 5.18 - 26.53 ng/mL 110.83 High   41.25 High   23.59  78.90 High   54.04 High   21.3 R      Component Ref Range & Units 5 mo ago  (4/21/23) 5 mo ago  (4/17/23) 6 mo ago  (3/30/23) 1 yr ago  (8/19/22) 1 yr ago  (8/19/22) 1 yr ago  (8/19/22) 1 yr ago  (8/11/22)   Cortisol Level ug/dL 1.1  7.8 CM  9.6 CM  22.7 CM  17.5 CM  9.1 CM  6.2 CM    -     Prolactin; Future; Expected date: 10/12/2023  -     Miscellaneous Test, Sendout Macroprolactin serum; Future; Expected date: 10/12/2023    2. Multinodular goiter   04/20/2023 largest nodule 1.1 cm x 8 mm x 1 cm Tir-rads 4         Follow up in about 6 months (around 4/12/2024) for Pituitary Microadenoma, Multinodular Goiter.    I spent a total of 30 minutes on the day of the visit.  This includes face to face time and non-face to face time preparing to see the patient (eg, review of tests), obtaining and/or reviewing separately obtained history, documenting clinical information in the electronic or other health record, independently interpreting results and communicating results to the patient/family/caregiver, or care coordinator.

## 2023-10-14 LAB — MAYO GENERIC ORDERABLE RESULT: ABNORMAL

## 2023-11-02 ENCOUNTER — TELEPHONE (OUTPATIENT)
Dept: ENDOCRINOLOGY | Facility: CLINIC | Age: 58
End: 2023-11-02
Payer: MEDICARE

## 2023-11-02 ENCOUNTER — LAB VISIT (OUTPATIENT)
Dept: LAB | Facility: HOSPITAL | Age: 58
End: 2023-11-02
Attending: NURSE PRACTITIONER
Payer: MEDICARE

## 2023-11-02 DIAGNOSIS — E22.9 PITUITARY MICROADENOMA WITH HYPERPROLACTINEMIA: ICD-10-CM

## 2023-11-02 DIAGNOSIS — D35.2 PITUITARY MICROADENOMA WITH HYPERPROLACTINEMIA: ICD-10-CM

## 2023-11-02 LAB — PROLACTIN LEVEL (OHS): 33.56 NG/ML (ref 5.18–26.53)

## 2023-11-02 PROCEDURE — 84146 ASSAY OF PROLACTIN: CPT

## 2023-11-02 PROCEDURE — 36415 COLL VENOUS BLD VENIPUNCTURE: CPT

## 2023-11-02 NOTE — PROGRESS NOTES
Instructed the patient that her prolactin level has decreased close to normal she is to continue her cabergoline and  Michelle will repeat her levels on her follow-up visit. Pt verbalized understanding.

## 2023-11-02 NOTE — TELEPHONE ENCOUNTER
----- Message from Michelle Navarro NP sent at 11/2/2023  7:46 AM CDT -----  Regarding: FW: Repeat labs  Please let patient know repeat her prolactin level.  Lab is nonfasting she can complete at anytime of the day  ----- Message -----  From: Michelle Navarro NP  Sent: 11/2/2023  12:00 AM CDT  To: Michelle Navarro NP  Subject: Repeat labs                                      Repeat prolactin level

## 2023-11-02 NOTE — TELEPHONE ENCOUNTER
Notified patient to come in to complete prolactin labs. Labs are non-fasting. Patient stated she'll come in and  complete today.
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
## 2023-12-15 ENCOUNTER — OFFICE VISIT (OUTPATIENT)
Dept: NEUROLOGY | Facility: CLINIC | Age: 58
End: 2023-12-15
Payer: MEDICARE

## 2023-12-15 VITALS
HEART RATE: 73 BPM | SYSTOLIC BLOOD PRESSURE: 129 MMHG | DIASTOLIC BLOOD PRESSURE: 84 MMHG | RESPIRATION RATE: 18 BRPM | HEIGHT: 67 IN | WEIGHT: 172.38 LBS | BODY MASS INDEX: 27.06 KG/M2 | OXYGEN SATURATION: 99 % | TEMPERATURE: 98 F

## 2023-12-15 DIAGNOSIS — G43.709 CHRONIC MIGRAINE WITHOUT AURA WITHOUT STATUS MIGRAINOSUS, NOT INTRACTABLE: ICD-10-CM

## 2023-12-15 DIAGNOSIS — G43.709 CHRONIC MIGRAINE WITHOUT AURA, NOT INTRACTABLE, WITHOUT STATUS MIGRAINOSUS: Primary | ICD-10-CM

## 2023-12-15 DIAGNOSIS — F48.9 TENSION: ICD-10-CM

## 2023-12-15 PROCEDURE — 3074F PR MOST RECENT SYSTOLIC BLOOD PRESSURE < 130 MM HG: ICD-10-PCS | Mod: CPTII,,, | Performed by: NURSE PRACTITIONER

## 2023-12-15 PROCEDURE — 3079F DIAST BP 80-89 MM HG: CPT | Mod: CPTII,,, | Performed by: NURSE PRACTITIONER

## 2023-12-15 PROCEDURE — 3044F HG A1C LEVEL LT 7.0%: CPT | Mod: CPTII,,, | Performed by: NURSE PRACTITIONER

## 2023-12-15 PROCEDURE — 1159F MED LIST DOCD IN RCRD: CPT | Mod: CPTII,,, | Performed by: NURSE PRACTITIONER

## 2023-12-15 PROCEDURE — 3008F PR BODY MASS INDEX (BMI) DOCUMENTED: ICD-10-PCS | Mod: CPTII,,, | Performed by: NURSE PRACTITIONER

## 2023-12-15 PROCEDURE — 1159F PR MEDICATION LIST DOCUMENTED IN MEDICAL RECORD: ICD-10-PCS | Mod: CPTII,,, | Performed by: NURSE PRACTITIONER

## 2023-12-15 PROCEDURE — 3079F PR MOST RECENT DIASTOLIC BLOOD PRESSURE 80-89 MM HG: ICD-10-PCS | Mod: CPTII,,, | Performed by: NURSE PRACTITIONER

## 2023-12-15 PROCEDURE — 3008F BODY MASS INDEX DOCD: CPT | Mod: CPTII,,, | Performed by: NURSE PRACTITIONER

## 2023-12-15 PROCEDURE — 3044F PR MOST RECENT HEMOGLOBIN A1C LEVEL <7.0%: ICD-10-PCS | Mod: CPTII,,, | Performed by: NURSE PRACTITIONER

## 2023-12-15 PROCEDURE — 1160F PR REVIEW ALL MEDS BY PRESCRIBER/CLIN PHARMACIST DOCUMENTED: ICD-10-PCS | Mod: CPTII,,, | Performed by: NURSE PRACTITIONER

## 2023-12-15 PROCEDURE — 99214 OFFICE O/P EST MOD 30 MIN: CPT | Mod: PBBFAC | Performed by: NURSE PRACTITIONER

## 2023-12-15 PROCEDURE — 99214 OFFICE O/P EST MOD 30 MIN: CPT | Mod: S$PBB,,, | Performed by: NURSE PRACTITIONER

## 2023-12-15 PROCEDURE — 1160F RVW MEDS BY RX/DR IN RCRD: CPT | Mod: CPTII,,, | Performed by: NURSE PRACTITIONER

## 2023-12-15 PROCEDURE — 99214 PR OFFICE/OUTPT VISIT, EST, LEVL IV, 30-39 MIN: ICD-10-PCS | Mod: S$PBB,,, | Performed by: NURSE PRACTITIONER

## 2023-12-15 PROCEDURE — 3074F SYST BP LT 130 MM HG: CPT | Mod: CPTII,,, | Performed by: NURSE PRACTITIONER

## 2023-12-15 RX ORDER — TIZANIDINE 2 MG/1
2 TABLET ORAL 3 TIMES DAILY PRN
Qty: 30 TABLET | Refills: 11 | Status: SHIPPED | OUTPATIENT
Start: 2023-12-15

## 2023-12-15 RX ORDER — ESZOPICLONE 3 MG/1
3 TABLET, COATED ORAL NIGHTLY
COMMUNITY
Start: 2023-11-28

## 2023-12-15 RX ORDER — SUMATRIPTAN SUCCINATE 100 MG/1
100 TABLET ORAL 2 TIMES DAILY PRN
Qty: 12 TABLET | Refills: 3 | Status: SHIPPED | OUTPATIENT
Start: 2023-12-15 | End: 2024-12-14
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
## 2024-01-03 DIAGNOSIS — K21.9 GASTROESOPHAGEAL REFLUX DISEASE, UNSPECIFIED WHETHER ESOPHAGITIS PRESENT: ICD-10-CM

## 2024-01-03 RX ORDER — OMEPRAZOLE 20 MG/1
40 CAPSULE, DELAYED RELEASE ORAL DAILY
Qty: 60 CAPSULE | Refills: 3 | Status: SHIPPED | OUTPATIENT
Start: 2024-01-03 | End: 2025-01-02

## 2024-01-23 ENCOUNTER — OFFICE VISIT (OUTPATIENT)
Dept: GYNECOLOGY | Facility: CLINIC | Age: 59
End: 2024-01-23
Payer: MEDICARE

## 2024-01-23 VITALS
WEIGHT: 164.81 LBS | RESPIRATION RATE: 18 BRPM | BODY MASS INDEX: 25.87 KG/M2 | HEART RATE: 86 BPM | HEIGHT: 67 IN | DIASTOLIC BLOOD PRESSURE: 80 MMHG | SYSTOLIC BLOOD PRESSURE: 115 MMHG | TEMPERATURE: 98 F | OXYGEN SATURATION: 100 %

## 2024-01-23 DIAGNOSIS — N95.2 ATROPHIC VAGINITIS: ICD-10-CM

## 2024-01-23 DIAGNOSIS — Z12.31 SCREENING MAMMOGRAM FOR BREAST CANCER: ICD-10-CM

## 2024-01-23 DIAGNOSIS — Z01.419 WOMEN'S ANNUAL ROUTINE GYNECOLOGICAL EXAMINATION: Primary | ICD-10-CM

## 2024-01-23 PROCEDURE — G0101 CA SCREEN;PELVIC/BREAST EXAM: HCPCS | Mod: PBBFAC | Performed by: NURSE PRACTITIONER

## 2024-01-23 PROCEDURE — 3074F SYST BP LT 130 MM HG: CPT | Mod: CPTII,,, | Performed by: NURSE PRACTITIONER

## 2024-01-23 PROCEDURE — 99214 OFFICE O/P EST MOD 30 MIN: CPT | Mod: PBBFAC,25 | Performed by: NURSE PRACTITIONER

## 2024-01-23 PROCEDURE — 1159F MED LIST DOCD IN RCRD: CPT | Mod: CPTII,,, | Performed by: NURSE PRACTITIONER

## 2024-01-23 PROCEDURE — 1160F RVW MEDS BY RX/DR IN RCRD: CPT | Mod: CPTII,,, | Performed by: NURSE PRACTITIONER

## 2024-01-23 PROCEDURE — G0101 CA SCREEN;PELVIC/BREAST EXAM: HCPCS | Mod: S$PBB,,, | Performed by: NURSE PRACTITIONER

## 2024-01-23 PROCEDURE — 3079F DIAST BP 80-89 MM HG: CPT | Mod: CPTII,,, | Performed by: NURSE PRACTITIONER

## 2024-01-23 RX ORDER — CONJUGATED ESTROGENS 0.62 MG/G
0.5 CREAM VAGINAL
Qty: 1 APPLICATOR | Refills: 3 | Status: SHIPPED | OUTPATIENT
Start: 2024-01-25 | End: 2024-04-18

## 2024-01-23 NOTE — PROGRESS NOTES
"Patient ID: Adelina Rosales is a 58 y.o. female.    Chief Complaint: Annual Exam             HPI:  Pt is  (hx of spontaneous  x 4) here for annual gyn exam. Denies hx of abnormal pap. Hx of partial hysterectomy secondary to symptomatic uterine fibroids. Denies vaginal bleeding or discharge. Denies abd/pelvic pain. Denies breast complaints. Pt is followed in medicine clinic for primary care, endocrine for pituitary adenoma, and [facility] for  bipolar/depression. States is controlled on meds. Denies SI/HI.She was given vaginal premarin at last visit for atrophic vaginitis. States uses as needed with improvement. NO gyn complaints. Denies dysuria. Denies dryness. She is  but states they are not sexually active.      Review of Systems:   Negative except for findings in HPI     Objective:   /80   Pulse 86   Temp 98.3 °F (36.8 °C) (Oral)   Resp 18   Ht 5' 7" (1.702 m)   Wt 74.8 kg (164 lb 12.8 oz)   SpO2 100%   BMI 25.81 kg/m²    Physical Exam:  GENERAL: Pt is aware and alert and  in no acute distress.  BREASTS: Bilateral-No masses, nipple discharge, skin changes, or tenderness.  ABDOMEN: Soft, non tender.  VULVA:  No lesions or skin changes.  URETHRA: No lesions  BLADDER: No tenderness.  VAGINA: Mucosa normal,no discharge; no lesions.  CERVIX:  absent  BIMANUAL EXAM:  The uterus is absent.  Danial adnexa reveal no evidence of masses; no fullness   SKIN: Warm and Dry  PSYCHIATRIC: Patient is awake and alert. Mood and affect are normal.    Assessment:   Women's annual routine gynecological examination    Screening mammogram for breast cancer  -     Mammo Digital Screening Andrey bajwa/ Jaime; Future; Expected date: 2024    Atrophic vaginitis    Other orders  -     conjugated estrogens (PREMARIN) vaginal cream; Place 0.5 g vaginally twice a week.  Dispense: 1 applicator; Refill: 3            1. Women's annual routine gynecological examination    2. Screening mammogram for breast " cancer    3. Atrophic vaginitis             -pelvic; pap deferred secondary to hyst for benign conditions  Plan:       Follow up in about 1 year (around 1/23/2025).

## 2024-02-19 ENCOUNTER — OFFICE VISIT (OUTPATIENT)
Dept: INTERNAL MEDICINE | Facility: CLINIC | Age: 59
End: 2024-02-19
Payer: MEDICARE

## 2024-02-19 VITALS
HEART RATE: 73 BPM | DIASTOLIC BLOOD PRESSURE: 85 MMHG | OXYGEN SATURATION: 99 % | RESPIRATION RATE: 18 BRPM | BODY MASS INDEX: 26.12 KG/M2 | WEIGHT: 166.81 LBS | TEMPERATURE: 98 F | SYSTOLIC BLOOD PRESSURE: 134 MMHG

## 2024-02-19 DIAGNOSIS — I10 HYPERTENSION, UNSPECIFIED TYPE: ICD-10-CM

## 2024-02-19 DIAGNOSIS — M85.862 OSTEOPENIA OF LEFT LOWER LEG: ICD-10-CM

## 2024-02-19 DIAGNOSIS — E04.2 MULTIPLE THYROID NODULES: Primary | ICD-10-CM

## 2024-02-19 DIAGNOSIS — E78.5 HYPERLIPIDEMIA, UNSPECIFIED HYPERLIPIDEMIA TYPE: ICD-10-CM

## 2024-02-19 PROCEDURE — 99214 OFFICE O/P EST MOD 30 MIN: CPT | Mod: PBBFAC | Performed by: STUDENT IN AN ORGANIZED HEALTH CARE EDUCATION/TRAINING PROGRAM
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
## 2024-03-26 ENCOUNTER — HOSPITAL ENCOUNTER (OUTPATIENT)
Dept: RADIOLOGY | Facility: HOSPITAL | Age: 59
Discharge: HOME | End: 2024-03-26
Attending: STUDENT IN AN ORGANIZED HEALTH CARE EDUCATION/TRAINING PROGRAM
Payer: MEDICARE

## 2024-03-26 DIAGNOSIS — M85.862 OSTEOPENIA OF LEFT LOWER LEG: ICD-10-CM

## 2024-03-26 PROCEDURE — 77080 DXA BONE DENSITY AXIAL: CPT | Mod: TC

## 2024-03-27 DIAGNOSIS — M85.851 OSTEOPENIA OF NECKS OF BOTH FEMURS: Primary | ICD-10-CM

## 2024-03-27 DIAGNOSIS — M85.852 OSTEOPENIA OF NECKS OF BOTH FEMURS: Primary | ICD-10-CM

## 2024-03-27 RX ORDER — VIT C/E/ZN/COPPR/LUTEIN/ZEAXAN 250MG-90MG
1000 CAPSULE ORAL DAILY
Qty: 90 CAPSULE | Refills: 3 | Status: SHIPPED | OUTPATIENT
Start: 2024-03-27 | End: 2025-03-27

## 2024-03-27 RX ORDER — CALCIUM CARBONATE 500(1250)
1 TABLET ORAL DAILY
Qty: 90 TABLET | Refills: 3 | Status: SHIPPED | OUTPATIENT
Start: 2024-03-27 | End: 2025-03-27

## 2024-03-27 NOTE — PROGRESS NOTES
DEXA scan shows osteopenia of bilateral femur. Orders sent for calcium and vitamin D supplementation.    Jenifer Garcia MD  [facility]  3/27/2024

## 2024-03-27 NOTE — PROGRESS NOTES
Pt called, name and  verified. Pt informed that her DEXA results showed she have osteopenia in both femurs and new orders for calcium and vitamin d sent to her pharmacy for her to take daily. Pt verbalized 100% understanding. No further questions or concerns noted. Call ended.

## 2024-04-18 ENCOUNTER — OFFICE VISIT (OUTPATIENT)
Dept: NEUROLOGY | Facility: CLINIC | Age: 59
End: 2024-04-18
Payer: MEDICARE

## 2024-04-18 VITALS
DIASTOLIC BLOOD PRESSURE: 85 MMHG | WEIGHT: 169.56 LBS | OXYGEN SATURATION: 99 % | HEART RATE: 85 BPM | HEIGHT: 67 IN | BODY MASS INDEX: 26.61 KG/M2 | SYSTOLIC BLOOD PRESSURE: 124 MMHG

## 2024-04-18 DIAGNOSIS — G43.709 CHRONIC MIGRAINE WITHOUT AURA, NOT INTRACTABLE, WITHOUT STATUS MIGRAINOSUS: Primary | ICD-10-CM

## 2024-04-18 PROCEDURE — 99214 OFFICE O/P EST MOD 30 MIN: CPT | Mod: S$PBB,,, | Performed by: NURSE PRACTITIONER

## 2024-04-18 PROCEDURE — 99213 OFFICE O/P EST LOW 20 MIN: CPT | Mod: PBBFAC | Performed by: NURSE PRACTITIONER

## 2024-04-18 PROCEDURE — 3079F DIAST BP 80-89 MM HG: CPT | Mod: CPTII,,, | Performed by: NURSE PRACTITIONER

## 2024-04-18 PROCEDURE — 1159F MED LIST DOCD IN RCRD: CPT | Mod: CPTII,,, | Performed by: NURSE PRACTITIONER

## 2024-04-18 PROCEDURE — 3008F BODY MASS INDEX DOCD: CPT | Mod: CPTII,,, | Performed by: NURSE PRACTITIONER

## 2024-04-18 PROCEDURE — 1160F RVW MEDS BY RX/DR IN RCRD: CPT | Mod: CPTII,,, | Performed by: NURSE PRACTITIONER

## 2024-04-18 PROCEDURE — 3074F SYST BP LT 130 MM HG: CPT | Mod: CPTII,,, | Performed by: NURSE PRACTITIONER

## 2024-04-18 PROCEDURE — G2211 COMPLEX E/M VISIT ADD ON: HCPCS | Mod: S$PBB,,, | Performed by: NURSE PRACTITIONER
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
## 2024-04-25 ENCOUNTER — HOSPITAL ENCOUNTER (OUTPATIENT)
Dept: RADIOLOGY | Facility: HOSPITAL | Age: 59
Discharge: HOME | End: 2024-04-25
Attending: STUDENT IN AN ORGANIZED HEALTH CARE EDUCATION/TRAINING PROGRAM
Payer: MEDICARE

## 2024-04-25 DIAGNOSIS — E04.2 MULTIPLE THYROID NODULES: ICD-10-CM

## 2024-04-25 PROCEDURE — 76536 US EXAM OF HEAD AND NECK: CPT | Mod: TC

## 2024-04-26 ENCOUNTER — TELEPHONE (OUTPATIENT)
Dept: ENDOCRINOLOGY | Facility: CLINIC | Age: 59
End: 2024-04-26
Payer: MEDICARE

## 2024-04-26 DIAGNOSIS — E22.9 PITUITARY MICROADENOMA WITH HYPERPROLACTINEMIA: ICD-10-CM

## 2024-04-26 DIAGNOSIS — D35.2 PITUITARY MICROADENOMA WITH HYPERPROLACTINEMIA: ICD-10-CM

## 2024-04-26 NOTE — TELEPHONE ENCOUNTER
----- Message from Jackie Lunsford sent at 4/25/2024  3:31 PM CDT -----  Regarding: Jf BAILEY PT       Pt is requesting a call back regarding Cabergoline. She is not sure if she is still prescribed this medication, she went to the pharmacy and they do not have any medication for her.    Please call @ [phone]

## 2024-04-26 NOTE — TELEPHONE ENCOUNTER
Recommend based on her prl values to go up from half tab twice a week to a whole tab twice a week and plan for repeat prl in a month.

## 2024-04-26 NOTE — TELEPHONE ENCOUNTER
I called pt and discussed the following :  Recommend based on her prl values to go up from half tab twice a week to a whole tab twice a week and plan for repeat prl in a month.     Pt verbalizes understanding

## 2024-04-27 ENCOUNTER — OFFICE VISIT (OUTPATIENT)
Dept: INTERNAL MEDICINE | Facility: CLINIC | Age: 59
End: 2024-04-27
Payer: MEDICARE

## 2024-04-27 VITALS
RESPIRATION RATE: 20 BRPM | HEIGHT: 67 IN | OXYGEN SATURATION: 97 % | DIASTOLIC BLOOD PRESSURE: 88 MMHG | WEIGHT: 166.81 LBS | SYSTOLIC BLOOD PRESSURE: 123 MMHG | BODY MASS INDEX: 26.18 KG/M2 | TEMPERATURE: 97 F | HEART RATE: 107 BPM

## 2024-04-27 DIAGNOSIS — Z23 NEED FOR PNEUMOCOCCAL 20-VALENT CONJUGATE VACCINATION: Primary | ICD-10-CM

## 2024-04-27 DIAGNOSIS — Z00.00 MEDICARE ANNUAL WELLNESS VISIT, INITIAL: ICD-10-CM

## 2024-04-27 PROCEDURE — 90677 PCV20 VACCINE IM: CPT | Mod: PBBFAC

## 2024-04-27 PROCEDURE — 99214 OFFICE O/P EST MOD 30 MIN: CPT | Mod: PBBFAC

## 2024-04-27 PROCEDURE — G0009 ADMIN PNEUMOCOCCAL VACCINE: HCPCS | Mod: PBBFAC

## 2024-04-27 RX ADMIN — PNEUMOCOCCAL 20-VALENT CONJUGATE VACCINE 0.5 ML
2.2; 2.2; 2.2; 2.2; 2.2; 2.2; 2.2; 2.2; 2.2; 2.2; 2.2; 2.2; 2.2; 2.2; 2.2; 2.2; 4.4; 2.2; 2.2; 2.2 INJECTION, SUSPENSION INTRAMUSCULAR at 10:04
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
## 2024-04-29 RX ORDER — CABERGOLINE 0.5 MG/1
TABLET ORAL
Qty: 8 TABLET | Refills: 1 | Status: SHIPPED | OUTPATIENT
Start: 2024-04-29

## 2024-05-07 DIAGNOSIS — K21.9 GASTROESOPHAGEAL REFLUX DISEASE, UNSPECIFIED WHETHER ESOPHAGITIS PRESENT: ICD-10-CM

## 2024-05-07 RX ORDER — OMEPRAZOLE 20 MG/1
40 CAPSULE, DELAYED RELEASE ORAL DAILY
Qty: 180 CAPSULE | Refills: 3 | Status: SHIPPED | OUTPATIENT
Start: 2024-05-07 | End: 2025-05-07

## 2024-05-20 ENCOUNTER — TELEPHONE (OUTPATIENT)
Dept: INTERNAL MEDICINE | Facility: CLINIC | Age: 59
End: 2024-05-20
Payer: MEDICARE

## 2024-05-20 DIAGNOSIS — R11.2 NAUSEA AND VOMITING, UNSPECIFIED VOMITING TYPE: ICD-10-CM

## 2024-05-20 DIAGNOSIS — K21.9 GASTROESOPHAGEAL REFLUX DISEASE, UNSPECIFIED WHETHER ESOPHAGITIS PRESENT: Primary | ICD-10-CM

## 2024-05-20 DIAGNOSIS — R10.13 EPIGASTRIC PAIN: ICD-10-CM

## 2024-05-20 NOTE — TELEPHONE ENCOUNTER
Called patient and date of birth verified. Patient states she went to [facility] 5/13/24 due to vomiting for 2 weeks, and pain in top of stomach and esophagus. States she was told she has fluid in her stomach and needs to see a GI doctor.  Patient was given medication and is no longer vomiting or experiencing pain. Please advise.

## 2024-05-20 NOTE — TELEPHONE ENCOUNTER
Called patient and date of birth verified. Notified patient of GI referral. Patient verbalized thanks.

## 2024-05-20 NOTE — TELEPHONE ENCOUNTER
----- Message from Stephenie Menendez sent at 5/20/2024 11:38 AM CDT -----  Regarding: Dr. Garcia    ThanksCaller is:  (Adelina Rosales) Patient       Provider: Dr. Garcia    Last Visit: Feb. 19, 2024    Next Visit: Sept. 4, 2024    Reason for Call:  Pt request a referral to GI due to fluid in her stomach.  Pt request a call back.  Please Advise.    Thanks     Preferred Phone Number:   [phone]

## 2024-06-26 DIAGNOSIS — D35.2 PITUITARY MICROADENOMA WITH HYPERPROLACTINEMIA: ICD-10-CM

## 2024-06-26 DIAGNOSIS — E22.9 PITUITARY MICROADENOMA WITH HYPERPROLACTINEMIA: ICD-10-CM

## 2024-06-26 RX ORDER — CABERGOLINE 0.5 MG/1
TABLET ORAL
Qty: 8 TABLET | Refills: 1 | Status: SHIPPED | OUTPATIENT
Start: 2024-06-26

## 2024-06-26 NOTE — TELEPHONE ENCOUNTER
Last visit in [facility]: 10/12/2023    Patient's next visit in [facility]: 10/25/2024

## 2024-07-31 ENCOUNTER — HOSPITAL ENCOUNTER (OUTPATIENT)
Dept: RADIOLOGY | Facility: HOSPITAL | Age: 59
Discharge: HOME | End: 2024-07-31
Attending: NURSE PRACTITIONER
Payer: MEDICARE

## 2024-07-31 DIAGNOSIS — Z12.31 SCREENING MAMMOGRAM FOR BREAST CANCER: ICD-10-CM

## 2024-07-31 PROCEDURE — 77067 SCR MAMMO BI INCL CAD: CPT | Mod: TC

## 2024-07-31 PROCEDURE — 77067 SCR MAMMO BI INCL CAD: CPT | Mod: 26,,, | Performed by: RADIOLOGY

## 2024-07-31 PROCEDURE — 77063 BREAST TOMOSYNTHESIS BI: CPT | Mod: 26,,, | Performed by: RADIOLOGY

## 2024-08-16 DIAGNOSIS — E22.9 PITUITARY MICROADENOMA WITH HYPERPROLACTINEMIA: ICD-10-CM

## 2024-08-16 DIAGNOSIS — D35.2 PITUITARY MICROADENOMA WITH HYPERPROLACTINEMIA: ICD-10-CM

## 2024-08-16 RX ORDER — CABERGOLINE 0.5 MG/1
TABLET ORAL
Qty: 8 TABLET | Refills: 1 | Status: SHIPPED | OUTPATIENT
Start: 2024-08-16

## 2024-10-02 ENCOUNTER — LAB VISIT (OUTPATIENT)
Dept: LAB | Facility: HOSPITAL | Age: 59
End: 2024-10-02
Attending: STUDENT IN AN ORGANIZED HEALTH CARE EDUCATION/TRAINING PROGRAM
Payer: MEDICARE

## 2024-10-02 ENCOUNTER — OFFICE VISIT (OUTPATIENT)
Dept: INTERNAL MEDICINE | Facility: CLINIC | Age: 59
End: 2024-10-02
Payer: MEDICARE

## 2024-10-02 VITALS
RESPIRATION RATE: 20 BRPM | SYSTOLIC BLOOD PRESSURE: 113 MMHG | HEART RATE: 100 BPM | TEMPERATURE: 99 F | OXYGEN SATURATION: 100 % | BODY MASS INDEX: 25.53 KG/M2 | WEIGHT: 163 LBS | DIASTOLIC BLOOD PRESSURE: 80 MMHG

## 2024-10-02 DIAGNOSIS — D35.2 PITUITARY ADENOMA: ICD-10-CM

## 2024-10-02 DIAGNOSIS — E04.2 MULTIPLE THYROID NODULES: ICD-10-CM

## 2024-10-02 DIAGNOSIS — E78.5 HYPERLIPIDEMIA, UNSPECIFIED HYPERLIPIDEMIA TYPE: ICD-10-CM

## 2024-10-02 DIAGNOSIS — J30.9 ALLERGIC RHINITIS, UNSPECIFIED SEASONALITY, UNSPECIFIED TRIGGER: ICD-10-CM

## 2024-10-02 DIAGNOSIS — D35.2 PITUITARY MICROADENOMA WITH HYPERPROLACTINEMIA: ICD-10-CM

## 2024-10-02 DIAGNOSIS — M85.862 OSTEOPENIA OF LEFT LOWER LEG: ICD-10-CM

## 2024-10-02 DIAGNOSIS — Z23 NEED FOR VACCINATION: Primary | ICD-10-CM

## 2024-10-02 DIAGNOSIS — K21.9 GASTROESOPHAGEAL REFLUX DISEASE, UNSPECIFIED WHETHER ESOPHAGITIS PRESENT: ICD-10-CM

## 2024-10-02 DIAGNOSIS — E22.9 PITUITARY MICROADENOMA WITH HYPERPROLACTINEMIA: ICD-10-CM

## 2024-10-02 DIAGNOSIS — I10 HYPERTENSION, UNSPECIFIED TYPE: ICD-10-CM

## 2024-10-02 LAB
ALBUMIN SERPL-MCNC: 4.1 G/DL (ref 3.4–4.8)
ALBUMIN/GLOB SERPL: 1.1 RATIO (ref 1.1–2)
ALP SERPL-CCNC: 86 UNIT/L (ref 40–150)
ALT SERPL-CCNC: 26 UNIT/L (ref 0–55)
ANION GAP SERPL CALC-SCNC: 7 MEQ/L
AST SERPL-CCNC: 23 UNIT/L (ref 5–34)
BASOPHILS # BLD AUTO: 0.08 X10(3)/MCL
BASOPHILS NFR BLD AUTO: 0.9 %
BILIRUB SERPL-MCNC: 0.7 MG/DL
BUN SERPL-MCNC: 12.2 MG/DL (ref 9.8–20.1)
CALCIUM SERPL-MCNC: 10.1 MG/DL (ref 8.4–10.2)
CHLORIDE SERPL-SCNC: 105 MMOL/L (ref 98–107)
CHOLEST SERPL-MCNC: 210 MG/DL
CHOLEST/HDLC SERPL: 4 {RATIO} (ref 0–5)
CO2 SERPL-SCNC: 27 MMOL/L (ref 23–31)
CREAT SERPL-MCNC: 0.94 MG/DL (ref 0.55–1.02)
CREAT/UREA NIT SERPL: 13
EOSINOPHIL # BLD AUTO: 0.34 X10(3)/MCL (ref 0–0.9)
EOSINOPHIL NFR BLD AUTO: 3.7 %
ERYTHROCYTE [DISTWIDTH] IN BLOOD BY AUTOMATED COUNT: 11.9 % (ref 11.5–17)
GFR SERPLBLD CREATININE-BSD FMLA CKD-EPI: >60 ML/MIN/1.73/M2
GLOBULIN SER-MCNC: 3.7 GM/DL (ref 2.4–3.5)
GLUCOSE SERPL-MCNC: 96 MG/DL (ref 82–115)
HCT VFR BLD AUTO: 39 % (ref 37–47)
HDLC SERPL-MCNC: 49 MG/DL (ref 35–60)
HGB BLD-MCNC: 13.8 G/DL (ref 12–16)
IMM GRANULOCYTES # BLD AUTO: 0.03 X10(3)/MCL (ref 0–0.04)
IMM GRANULOCYTES NFR BLD AUTO: 0.3 %
LDLC SERPL CALC-MCNC: 117 MG/DL (ref 50–140)
LYMPHOCYTES # BLD AUTO: 2.37 X10(3)/MCL (ref 0.6–4.6)
LYMPHOCYTES NFR BLD AUTO: 25.9 %
MCH RBC QN AUTO: 32.2 PG (ref 27–31)
MCHC RBC AUTO-ENTMCNC: 35.4 G/DL (ref 33–36)
MCV RBC AUTO: 90.9 FL (ref 80–94)
MONOCYTES # BLD AUTO: 0.62 X10(3)/MCL (ref 0.1–1.3)
MONOCYTES NFR BLD AUTO: 6.8 %
NEUTROPHILS # BLD AUTO: 5.7 X10(3)/MCL (ref 2.1–9.2)
NEUTROPHILS NFR BLD AUTO: 62.4 %
NRBC BLD AUTO-RTO: 0 %
PLATELET # BLD AUTO: 394 X10(3)/MCL (ref 130–400)
PMV BLD AUTO: 9.1 FL (ref 7.4–10.4)
POTASSIUM SERPL-SCNC: 4.1 MMOL/L (ref 3.5–5.1)
PROLACTIN LEVEL (OLG): 6.03 NG/ML (ref 5.18–26.53)
PROT SERPL-MCNC: 7.8 GM/DL (ref 5.8–7.6)
RBC # BLD AUTO: 4.29 X10(6)/MCL (ref 4.2–5.4)
SODIUM SERPL-SCNC: 139 MMOL/L (ref 136–145)
TRIGL SERPL-MCNC: 218 MG/DL (ref 37–140)
TSH SERPL-ACNC: 1.05 UIU/ML (ref 0.35–4.94)
VLDLC SERPL CALC-MCNC: 44 MG/DL
WBC # BLD AUTO: 9.14 X10(3)/MCL (ref 4.5–11.5)

## 2024-10-02 PROCEDURE — 99213 OFFICE O/P EST LOW 20 MIN: CPT | Mod: PBBFAC | Performed by: STUDENT IN AN ORGANIZED HEALTH CARE EDUCATION/TRAINING PROGRAM

## 2024-10-02 PROCEDURE — 84443 ASSAY THYROID STIM HORMONE: CPT

## 2024-10-02 PROCEDURE — 85025 COMPLETE CBC W/AUTO DIFF WBC: CPT

## 2024-10-02 PROCEDURE — 80053 COMPREHEN METABOLIC PANEL: CPT

## 2024-10-02 PROCEDURE — 36415 COLL VENOUS BLD VENIPUNCTURE: CPT

## 2024-10-02 PROCEDURE — 80061 LIPID PANEL: CPT

## 2024-10-02 PROCEDURE — 84146 ASSAY OF PROLACTIN: CPT

## 2024-10-02 RX ORDER — ONDANSETRON 8 MG/1
8 TABLET, ORALLY DISINTEGRATING ORAL EVERY 8 HOURS PRN
COMMUNITY
Start: 2024-09-05

## 2024-10-02 RX ORDER — PANTOPRAZOLE SODIUM 40 MG/1
40 TABLET, DELAYED RELEASE ORAL EVERY MORNING
COMMUNITY
Start: 2024-09-05 | End: 2024-10-02

## 2024-10-02 RX ORDER — FAMOTIDINE 40 MG/1
40 TABLET, FILM COATED ORAL 2 TIMES DAILY
COMMUNITY
Start: 2024-07-24

## 2024-10-02 RX ORDER — FLUTICASONE PROPIONATE 50 MCG
1 SPRAY, SUSPENSION (ML) NASAL 2 TIMES DAILY
Qty: 16 G | Refills: 11 | Status: SHIPPED | OUTPATIENT
Start: 2024-10-02

## 2024-10-02 RX ORDER — ATORVASTATIN CALCIUM 40 MG/1
40 TABLET, FILM COATED ORAL DAILY
Qty: 90 TABLET | Refills: 3 | Status: SHIPPED | OUTPATIENT
Start: 2024-10-02 | End: 2025-10-02

## 2024-10-02 RX ORDER — SUCRALFATE 1 G/10ML
1 SUSPENSION ORAL 4 TIMES DAILY
Qty: 1200 ML | Refills: 0 | Status: SHIPPED | OUTPATIENT
Start: 2024-10-02 | End: 2024-11-01

## 2024-10-02 NOTE — PROGRESS NOTES
I saw and evaluated the patient and was present for the key portions of the exam and separately billed procedures, if any.  I have reviewed and agree with the resident's findings, including all diagnostic interpretations and plans as written.    Recent hospitalization for N/V with GB stones without cholecystitis seen on imaging and underwent cholecysectomy earlier this month. Doing overal well however with worsening of reflux on PPI and H2 blocker. Agree with sucralfate in the meantime. Pt has appointment with GI at the end of the month.    Nelsy Yoon MD
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
## 2024-10-16 ENCOUNTER — TELEPHONE (OUTPATIENT)
Dept: NEUROLOGY | Facility: CLINIC | Age: 59
End: 2024-10-16

## 2024-10-16 ENCOUNTER — OFFICE VISIT (OUTPATIENT)
Dept: NEUROLOGY | Facility: CLINIC | Age: 59
End: 2024-10-16
Payer: MEDICARE

## 2024-10-16 VITALS
SYSTOLIC BLOOD PRESSURE: 130 MMHG | WEIGHT: 166 LBS | HEART RATE: 90 BPM | BODY MASS INDEX: 26.06 KG/M2 | OXYGEN SATURATION: 100 % | DIASTOLIC BLOOD PRESSURE: 88 MMHG | HEIGHT: 67 IN

## 2024-10-16 DIAGNOSIS — G43.709 CHRONIC MIGRAINE WITHOUT AURA, NOT INTRACTABLE, WITHOUT STATUS MIGRAINOSUS: Primary | ICD-10-CM

## 2024-10-16 DIAGNOSIS — G43.709 CHRONIC MIGRAINE WITHOUT AURA WITHOUT STATUS MIGRAINOSUS, NOT INTRACTABLE: ICD-10-CM

## 2024-10-16 DIAGNOSIS — F48.9 TENSION: ICD-10-CM

## 2024-10-16 PROCEDURE — 99214 OFFICE O/P EST MOD 30 MIN: CPT | Mod: S$PBB,,, | Performed by: NURSE PRACTITIONER

## 2024-10-16 PROCEDURE — 3079F DIAST BP 80-89 MM HG: CPT | Mod: CPTII,,, | Performed by: NURSE PRACTITIONER

## 2024-10-16 PROCEDURE — 1160F RVW MEDS BY RX/DR IN RCRD: CPT | Mod: CPTII,,, | Performed by: NURSE PRACTITIONER

## 2024-10-16 PROCEDURE — 1159F MED LIST DOCD IN RCRD: CPT | Mod: CPTII,,, | Performed by: NURSE PRACTITIONER

## 2024-10-16 PROCEDURE — 3075F SYST BP GE 130 - 139MM HG: CPT | Mod: CPTII,,, | Performed by: NURSE PRACTITIONER

## 2024-10-16 PROCEDURE — 99214 OFFICE O/P EST MOD 30 MIN: CPT | Mod: PBBFAC | Performed by: NURSE PRACTITIONER

## 2024-10-16 PROCEDURE — 3008F BODY MASS INDEX DOCD: CPT | Mod: CPTII,,, | Performed by: NURSE PRACTITIONER

## 2024-10-16 RX ORDER — SUVOREXANT 20 MG/1
1 TABLET, FILM COATED ORAL NIGHTLY
COMMUNITY
Start: 2024-10-03

## 2024-10-16 RX ORDER — SUMATRIPTAN SUCCINATE 100 MG/1
100 TABLET ORAL 2 TIMES DAILY PRN
Qty: 12 TABLET | Refills: 3 | Status: SHIPPED | OUTPATIENT
Start: 2024-10-16 | End: 2025-10-16

## 2024-10-16 RX ORDER — TIZANIDINE 2 MG/1
2 TABLET ORAL 3 TIMES DAILY PRN
Qty: 30 TABLET | Refills: 11 | Status: SHIPPED | OUTPATIENT
Start: 2024-10-16
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
## 2024-10-16 NOTE — TELEPHONE ENCOUNTER
----- Message from Gina sent at 10/16/2024 11:44 AM CDT -----  [facility] called about medication sumatriptan, stating they need to know how many headaches per month does this pt get for insurance purposes, they can be reached @ [phone]

## 2024-10-25 ENCOUNTER — LAB VISIT (OUTPATIENT)
Dept: LAB | Facility: HOSPITAL | Age: 59
End: 2024-10-25
Attending: NURSE PRACTITIONER
Payer: MEDICARE

## 2024-10-25 ENCOUNTER — TELEPHONE (OUTPATIENT)
Dept: ENDOCRINOLOGY | Facility: CLINIC | Age: 59
End: 2024-10-25

## 2024-10-25 ENCOUNTER — OFFICE VISIT (OUTPATIENT)
Dept: ENDOCRINOLOGY | Facility: CLINIC | Age: 59
End: 2024-10-25
Payer: MEDICARE

## 2024-10-25 VITALS
BODY MASS INDEX: 26.14 KG/M2 | RESPIRATION RATE: 14 BRPM | HEIGHT: 67 IN | HEART RATE: 77 BPM | WEIGHT: 166.56 LBS | TEMPERATURE: 98 F | DIASTOLIC BLOOD PRESSURE: 88 MMHG | SYSTOLIC BLOOD PRESSURE: 133 MMHG

## 2024-10-25 DIAGNOSIS — E04.2 MULTINODULAR GOITER: ICD-10-CM

## 2024-10-25 DIAGNOSIS — E22.9 PITUITARY MICROADENOMA WITH HYPERPROLACTINEMIA: Primary | ICD-10-CM

## 2024-10-25 DIAGNOSIS — D35.2 PITUITARY MICROADENOMA WITH HYPERPROLACTINEMIA: Primary | ICD-10-CM

## 2024-10-25 DIAGNOSIS — D35.2 PITUITARY MICROADENOMA WITH HYPERPROLACTINEMIA: ICD-10-CM

## 2024-10-25 DIAGNOSIS — E22.9 PITUITARY MICROADENOMA WITH HYPERPROLACTINEMIA: ICD-10-CM

## 2024-10-25 PROCEDURE — 99214 OFFICE O/P EST MOD 30 MIN: CPT | Mod: PBBFAC | Performed by: NURSE PRACTITIONER

## 2024-10-25 PROCEDURE — 36415 COLL VENOUS BLD VENIPUNCTURE: CPT

## 2024-10-25 PROCEDURE — 84305 ASSAY OF SOMATOMEDIN: CPT

## 2024-10-25 RX ORDER — METHYLPREDNISOLONE 4 MG/1
4 TABLET ORAL
COMMUNITY
Start: 2024-10-23

## 2024-10-25 RX ORDER — CABERGOLINE 0.5 MG/1
TABLET ORAL
Qty: 8 TABLET | Refills: 3 | Status: SHIPPED | OUTPATIENT
Start: 2024-10-25

## 2024-10-25 RX ORDER — KETOROLAC TROMETHAMINE 10 MG/1
10 TABLET, FILM COATED ORAL EVERY 6 HOURS PRN
COMMUNITY
Start: 2024-10-23 | End: 2024-10-28

## 2024-10-25 NOTE — PROGRESS NOTES
Subjective     Patient ID: Adelina Rosales is a 59 y.o. female.    Chief Complaint: Follow-up pituitary Microadenoma    Endocrine clinic note 08/11/2022: 51-year-old female scheduled today as new patient referral to endocrine clinic for pituitary microadenoma and hyperprolactinemia.  Patient had MRI head MRI 06/21/20221. Suggestion of a left pituitary 0.2 cm microadenoma.  Patient previously had a history of migraine headaches but states that time she has a headache that is in the center of her forehead between her eyes has occurred more frequently.  Patient was found to have elevated prolactin level of 70.90 previous prolactin levels were 54.04 and  21.32 years ago.  Patient denies any galactorrhea or breast fullness.  Patient is postmenopausal and has not had cycles and almost 9 years.  Will complete workup to start cabergoline due to elevated prolactin level.     Endocrine clinic note 3/30/2023:  58-year-old female scheduled today for endocrine clinic follow-up.  History of pituitary microadenoma and hyperprolactinemia.  On initial workup prolactin level 23.59, IGF-1 97, cortisol 6.2, acth 9.1, TSH 1.5533, free T4 1.09  Tremayne stim test baseline cortisol 6.2, 1/2 hour 9.1, 1 hour 17.5 (by below cutoff of 18).  Patient had decrease in prolactin level.  We will repeat today to ensure patient's prolactin level is normal.  Patient reports increased amount of headaches that are directly in the front of her head to wean her eyes that has become increasingly worse and happens 5-6 times per week patient denies headaches in any other part of her head patient denies galactorrhea.  MRI 06/21/2022 1. Suggestion of a left pituitary 0.2 cm microadenoma.      Endocrine clinic note 10/12/2023:  58-year-old female scheduled today for endocrine clinic follow-up.  History of pituitary microadenoma and hyperprolactinemia. On initial workup prolactin level 23.59, IGF-1 97, cortisol 6.2, acth 9.1, TSH 1.5533, free T4 1.09, Tremayne stim  test baseline cortisol 6.2, 1/2 hour 9.1, 1 hour 17.5 (by below cutoff of 18), Repeat natasha stem 9.1, 17.5 and 1 hour 22.7 on 08/19/2022. Night time salivary normal x 2 on 05/03/2023, Dex suppression test cortisol 1.1 on 04/21/2023, Prolactin 110.83 on 04/17/2023 pt also on Mellaril and Latuda giving , mixed picture if related to pituitary microadenoma or the patient's medications.  Patient was previously having headaches referred to neuro clinic for treatment of headaches, patient is currently being treated by neuro for her headaches.  Patient denies galactorrhea. Multinodular goiter US 04/20/2023 largest nodule 1.1 cm x 8 mm x 1 cm Tir-rads 4.  Patient denies any palpable nodules.      Endocrine clinic note 10/25/2024:  59-year-old female scheduled today for endocrine clinic follow-up.  History of pituitary adenoma and hyperprolactinemia. On initial workup prolactin level 23.59, IGF-1 97, cortisol 6.2, acth 9.1, TSH 1.5533, free T4 1.09, Natasha stim test baseline cortisol 6.2, 1/2 hour 9.1, 1 hour 17.5 (by below cutoff of 18), Repeat natasha stem 9.1, 17.5 and 1 hour 22.7 on 08/19/2022. Night time salivary normal x 2 on 05/03/2023, Dex suppression test cortisol 1.1 on 04/21/2023, Prolactin 110.83 on 04/17/2023 pt also on Mellaril and Latuda giving , mixed picture if related to pituitary microadenoma or the patient's medications. Multinodular goiter US 04/20/2023 largest nodule 1.1 cm x 8 mm x 1 cm Tir-rads 4.  Patient denies any palpable nodules.  Patient reports she is currently on cabergoline 1 tablet twice a day week.  She denies any galactorrhea.  After being seen in neuro patient states that her headaches have been better and she only takes her Imitrex as needed and denies any recent headaches.          Narrative & Impression  EXAMINATION:  MRI BRAIN PITUITARY W W/O CONTRAST     CLINICAL HISTORY:  brain or lesion suspected; elevated prolactin with daily migraines; r/o pituitary adenoma;;Other specified disorders of  brain     TECHNIQUE:  Multiplanar, multisequence MR images through the sella were obtained before and after the administration of intravenous gadolinium based contrast.     COMPARISON:  Head CT 04/18/2022.     Brain MRI 05/08/2012.     FINDINGS:  Sella: Normal in size.     Pituitary gland: Left pituitary 0.2 cm focus of relative hypoenhancement (series 15, image 32).  Otherwise normal in height and signal.     Pituitary stalk: At midline.     Optic chiasm: Well visualized, normal in size and signal.     Cavernous sinuses: Normal in size and symmetric.     Clivus: Intact.     Sphenoid Sinuses: No T2 hyperintense inflammatory changes.     Visualized brain parenchyma:     No mass, hemorrhage or acute vascular insults.     Incidental left middle frontal developmental venous anomaly.     Otherwise no enhancing abnormalities.     Ventricles are normal in size and configuration. No hydrocephalus.     No extra-axial masses or fluid collections.     Normal flow voids in the major arteries and veins.     No craniocervical abnormalities.     Impression:     1. Suggestion of a left pituitary 0.2 cm microadenoma.     2. No acute intracranial abnormalities.        Electronically signed by: Christiano Aldridge  Date:                                            06/21/2022  Time:                                           12:36     Exam Ended: 06/21/22 11:55 Last Resulted: 06/21/22 12:36      Result Notes  Details        Reading PhysicianReading DateResult Priority  Jovanny Lucero MD  [phone]/20/2023Routine     Narrative & Impression  EXAMINATION:  US THYROID     CLINICAL HISTORY:  , Nontoxic multinodular goiter.     TECHNIQUE:  Multiple transverse and sagittal grayscale sonographic images were acquired through the thyroid gland.     COMPARISON:  March 23, 2021     FINDINGS:  Examination reveals the right hemithyroid to measure 4.4 x 1.7 x 1.8 cm, left adarsh thyroid measures 4.4 x 1.2 x 1.4 cm isthmus measures 1.9 mm in  thickness.     There is heterogenicity of the thyroid parenchyma some subcentimeter nodules are identified on the right largest measuring approximately 1.1 by 8 mm x 1 cm solid and hypoechoic and corresponds to a TI-RADS type 4 nodule biopsy recommended if greater than 1.5 cm follow-up recommended if greater than 1 cm at 1 year, 2 years, 3 years and 5 years.     There is some heterogenicity of the left adarsh thyroid with no significant not Brendon T is identified     Impression:     Subcentimeter nodules in the right hemithyroid do not meet criteria for biopsy or surveillance.     Largest nodule in the right hemithyroid with recommendations as above.     Heterogenicity of the thyroid parenchyma        Electronically signed by: Jovanny Lucero  Date:                                            04/20/2023  Time:                                           12:42           Exam Ended: 04/20/23 09:34Last Resulted: 04/20/23 12:42                 Review of Systems       Objective     Physical Exam  Constitutional:       General: She is not in acute distress.     Appearance: Normal appearance. She is not ill-appearing.   HENT:      Head: Normocephalic and atraumatic.      Right Ear: External ear normal.      Left Ear: External ear normal.      Nose: Nose normal. No congestion or rhinorrhea.      Mouth/Throat:      Mouth: Mucous membranes are moist.      Pharynx: Oropharynx is clear. No oropharyngeal exudate.   Eyes:      General:         Right eye: No discharge.         Left eye: No discharge.      Conjunctiva/sclera: Conjunctivae normal.      Pupils: Pupils are equal, round, and reactive to light.   Neck:      Thyroid: No thyroid mass, thyromegaly or thyroid tenderness.   Cardiovascular:      Rate and Rhythm: Normal rate and regular rhythm.      Pulses: Normal pulses.      Heart sounds: Normal heart sounds. No murmur heard.  Pulmonary:      Effort: Pulmonary effort is normal. No respiratory distress.      Breath sounds: Normal  breath sounds.   Abdominal:      General: Abdomen is flat. Bowel sounds are normal. There is no distension.      Palpations: Abdomen is soft.      Tenderness: There is no abdominal tenderness.   Musculoskeletal:         General: No swelling or tenderness. Normal range of motion.      Cervical back: Normal range of motion and neck supple. No tenderness.      Right lower leg: No edema.      Left lower leg: No edema.   Feet:      Right foot:      Skin integrity: Skin integrity normal.      Left foot:      Skin integrity: Skin integrity normal.   Lymphadenopathy:      Cervical: No cervical adenopathy.   Skin:     General: Skin is warm and dry.      Coloration: Skin is not jaundiced or pale.   Neurological:      General: No focal deficit present.      Mental Status: She is alert and oriented to person, place, and time. Mental status is at baseline.      Coordination: Coordination normal.      Gait: Gait normal.   Psychiatric:         Mood and Affect: Mood normal.         Behavior: Behavior normal.         Thought Content: Thought content normal.            Assessment and Plan     1. Pituitary microadenoma with hyperprolactinemia  On initial workup prolactin level 23.59, IGF-1 97, cortisol 6.2, acth 9.1, TSH 1.5533, free T4 1.09  Natasha stim test baseline cortisol 6.2, 1/2 hour 9.1, 1 hour 17.5 (by below cutoff of 18)  Repeat natasha stem 9.1, 17.5 and 1 hour 22.7 on 08/19/2022   Night time salivary normal x 2 on 05/03/2023   Dex suppression test cortisol 1.1 on 04/21/2023  Prolactin level 6.03 on 10/02/2024  Prolactin 110.83 on 04/17/2023 pt also on Latuda previously on Mellaril stopped over 1 year ago     Component Ref Range & Units 3 wk ago  (10/2/24) 11 mo ago  (11/2/23) 1 yr ago  (10/12/23) 1 yr ago  (4/17/23) 1 yr ago  (3/30/23) 2 yr ago  (8/11/22) 2 yr ago  (12/27/21)   Prolactin Level 5.18 - 26.53 ng/mL 6.03 33.56 High  112.86 High  110.83 High  41.25 High  23.59 78.90 High    -     Cancel: Prolactin; Future; Expected  date: 10/25/2024  -     Insulin-Like Growth Factor; Future; Expected date: 10/25/2024  -     cabergoline (DOSTINEX) 0.5 mg tablet; TAKE 1 TABLET BY MOUTH 2 TIMES A WEEK  Dispense: 8 tablet; Refill: 3    2. Multinodular goiter  US 04/20/2023 largest nodule 1.1 cm x 8 mm x 1 cm Tir-rads 4   Repeat ultrasound 04/25/2024 right nodule increase to 1.4 cm from previous 1.1 cm TI-RADS 3          Follow up in about 6 months (around 4/25/2025) for Pituitary Microadenoma.

## 2024-10-25 NOTE — TELEPHONE ENCOUNTER
----- Message from DAHLIA Quintana sent at 10/25/2024  1:20 PM CDT -----    ----- Message -----  From: Lynn Daniels  Sent: 10/25/2024   1:15 PM CDT  To: #    Patient of Michelle Lorenzana with the pharmacy ([facility])      cabergoline (DOSTINEX) 0.5 mg tablet and sumatriptan (IMITREX) 100 MG tablet has to be taken 24 hrs apart.  Pharmacy states they had to document if it is okay.       1:14  Thanks,

## 2024-10-30 ENCOUNTER — OFFICE VISIT (OUTPATIENT)
Dept: GASTROENTEROLOGY | Facility: CLINIC | Age: 59
End: 2024-10-30
Payer: MEDICARE

## 2024-10-30 VITALS
HEIGHT: 67 IN | TEMPERATURE: 99 F | RESPIRATION RATE: 16 BRPM | HEART RATE: 110 BPM | DIASTOLIC BLOOD PRESSURE: 80 MMHG | WEIGHT: 163 LBS | SYSTOLIC BLOOD PRESSURE: 117 MMHG | BODY MASS INDEX: 25.58 KG/M2 | OXYGEN SATURATION: 99 %

## 2024-10-30 DIAGNOSIS — Z12.11 SCREENING FOR COLON CANCER: ICD-10-CM

## 2024-10-30 DIAGNOSIS — K21.9 GASTROESOPHAGEAL REFLUX DISEASE, UNSPECIFIED WHETHER ESOPHAGITIS PRESENT: Primary | ICD-10-CM

## 2024-10-30 DIAGNOSIS — R10.13 EPIGASTRIC PAIN: ICD-10-CM

## 2024-10-30 DIAGNOSIS — R14.0 BLOATING SYMPTOM: ICD-10-CM

## 2024-10-30 LAB
IGF-I SERPL-MCNC: 154 NG/ML (ref 35–201)
IGF-I Z-SCORE SERPL: 1.28 SD

## 2024-10-30 PROCEDURE — 3079F DIAST BP 80-89 MM HG: CPT | Mod: CPTII,,, | Performed by: NURSE PRACTITIONER

## 2024-10-30 PROCEDURE — 3074F SYST BP LT 130 MM HG: CPT | Mod: CPTII,,, | Performed by: NURSE PRACTITIONER

## 2024-10-30 PROCEDURE — 99215 OFFICE O/P EST HI 40 MIN: CPT | Mod: PBBFAC | Performed by: NURSE PRACTITIONER

## 2024-10-30 PROCEDURE — 1159F MED LIST DOCD IN RCRD: CPT | Mod: CPTII,,, | Performed by: NURSE PRACTITIONER

## 2024-10-30 PROCEDURE — 99204 OFFICE O/P NEW MOD 45 MIN: CPT | Mod: S$PBB,,, | Performed by: NURSE PRACTITIONER

## 2024-10-30 PROCEDURE — 1160F RVW MEDS BY RX/DR IN RCRD: CPT | Mod: CPTII,,, | Performed by: NURSE PRACTITIONER

## 2024-10-30 PROCEDURE — 3008F BODY MASS INDEX DOCD: CPT | Mod: CPTII,,, | Performed by: NURSE PRACTITIONER

## 2024-10-30 RX ORDER — FAMOTIDINE 40 MG/1
40 TABLET, FILM COATED ORAL NIGHTLY PRN
Qty: 30 TABLET | Refills: 5 | Status: SHIPPED | OUTPATIENT
Start: 2024-10-30

## 2024-10-30 RX ORDER — SUCRALFATE 1 G/1
1 TABLET ORAL
Qty: 120 TABLET | Refills: 3 | Status: SHIPPED | OUTPATIENT
Start: 2024-10-30

## 2024-10-30 RX ORDER — SUCRALFATE 1 G/10ML
1 SUSPENSION ORAL 4 TIMES DAILY
COMMUNITY
End: 2024-10-30

## 2024-11-14 ENCOUNTER — LAB VISIT (OUTPATIENT)
Dept: LAB | Facility: HOSPITAL | Age: 59
End: 2024-11-14
Attending: NURSE PRACTITIONER
Payer: MEDICARE

## 2024-11-14 DIAGNOSIS — R14.0 BLOATING SYMPTOM: ICD-10-CM

## 2024-11-14 DIAGNOSIS — K21.9 GASTROESOPHAGEAL REFLUX DISEASE, UNSPECIFIED WHETHER ESOPHAGITIS PRESENT: ICD-10-CM

## 2024-11-14 DIAGNOSIS — R10.13 EPIGASTRIC PAIN: ICD-10-CM

## 2024-11-14 LAB — H. PYLORI STOOL: NEGATIVE

## 2024-11-14 PROCEDURE — 87338 HPYLORI STOOL AG IA: CPT

## 2024-12-09 ENCOUNTER — TELEPHONE (OUTPATIENT)
Dept: INTERNAL MEDICINE | Facility: CLINIC | Age: 59
End: 2024-12-09
Payer: MEDICARE

## 2024-12-09 DIAGNOSIS — K21.9 GASTROESOPHAGEAL REFLUX DISEASE, UNSPECIFIED WHETHER ESOPHAGITIS PRESENT: Primary | ICD-10-CM

## 2024-12-09 NOTE — TELEPHONE ENCOUNTER
"Spoke with patient who states her symptoms are "horrible and I can't take it anymore" Pt sees bianka Florez  (10/30/24) FOR gastro AND HAS SCOPE SCHEDULED IN May 2025. pt STATES THAT IS TOO LONG. And would like referral sent to Dr Suresh . Pt advised to call GI and notify of current complaints. Pt states she will but would like the referral placed  "

## 2024-12-09 NOTE — TELEPHONE ENCOUNTER
----- Message from Danni sent at 12/9/2024 10:36 AM CST -----  Regarding: Dr Mcnulty  Caller is:  (Adelina) Patient       Provider:Dr Mcnulty    Last Visit:10/2/24    Next Visit:4/14/24    Reason for Call: patient need referral for gastro clinic as soon as possible per patient.            Preferred Phone Number: [phone]

## 2025-01-20 ENCOUNTER — OFFICE VISIT (OUTPATIENT)
Dept: URGENT CARE | Facility: CLINIC | Age: 60
End: 2025-01-20
Payer: MEDICARE

## 2025-01-20 VITALS
BODY MASS INDEX: 23.54 KG/M2 | RESPIRATION RATE: 22 BRPM | DIASTOLIC BLOOD PRESSURE: 82 MMHG | WEIGHT: 150 LBS | OXYGEN SATURATION: 98 % | HEIGHT: 67 IN | SYSTOLIC BLOOD PRESSURE: 106 MMHG | HEART RATE: 99 BPM | TEMPERATURE: 98 F

## 2025-01-20 DIAGNOSIS — R05.1 ACUTE COUGH: ICD-10-CM

## 2025-01-20 DIAGNOSIS — R06.2 WHEEZING: ICD-10-CM

## 2025-01-20 DIAGNOSIS — J40 BRONCHITIS: Primary | ICD-10-CM

## 2025-01-20 LAB
CTP QC/QA: YES
CTP QC/QA: YES
POC MOLECULAR INFLUENZA A AGN: NEGATIVE
POC MOLECULAR INFLUENZA B AGN: NEGATIVE
SARS-COV-2 RDRP RESP QL NAA+PROBE: NEGATIVE

## 2025-01-20 PROCEDURE — 99203 OFFICE O/P NEW LOW 30 MIN: CPT | Mod: 25,,, | Performed by: NURSE PRACTITIONER

## 2025-01-20 PROCEDURE — 94640 AIRWAY INHALATION TREATMENT: CPT | Mod: ,,, | Performed by: NURSE PRACTITIONER

## 2025-01-20 PROCEDURE — 87635 SARS-COV-2 COVID-19 AMP PRB: CPT | Mod: QW,,, | Performed by: NURSE PRACTITIONER

## 2025-01-20 PROCEDURE — 87502 INFLUENZA DNA AMP PROBE: CPT | Mod: QW,,, | Performed by: NURSE PRACTITIONER

## 2025-01-20 PROCEDURE — 96372 THER/PROPH/DIAG INJ SC/IM: CPT | Mod: 59,,, | Performed by: NURSE PRACTITIONER

## 2025-01-20 RX ORDER — IPRATROPIUM BROMIDE AND ALBUTEROL SULFATE 2.5; .5 MG/3ML; MG/3ML
3 SOLUTION RESPIRATORY (INHALATION) EVERY 6 HOURS PRN
Qty: 75 ML | Refills: 0 | Status: SHIPPED | OUTPATIENT
Start: 2025-01-20 | End: 2026-01-20

## 2025-01-20 RX ORDER — AZITHROMYCIN 250 MG/1
TABLET, FILM COATED ORAL
Qty: 6 TABLET | Refills: 0 | Status: SHIPPED | OUTPATIENT
Start: 2025-01-20 | End: 2025-01-25

## 2025-01-20 RX ORDER — ALBUTEROL SULFATE 90 UG/1
2 INHALANT RESPIRATORY (INHALATION) EVERY 4 HOURS PRN
COMMUNITY
Start: 2025-01-14 | End: 2025-01-21

## 2025-01-20 RX ORDER — DEXAMETHASONE SODIUM PHOSPHATE 10 MG/ML
10 INJECTION INTRAMUSCULAR; INTRAVENOUS
Status: COMPLETED | OUTPATIENT
Start: 2025-01-20 | End: 2025-01-20

## 2025-01-20 RX ORDER — DOXYCYCLINE 100 MG/1
100 CAPSULE ORAL
COMMUNITY
Start: 2025-01-14 | End: 2025-01-24

## 2025-01-20 RX ORDER — PREDNISONE 10 MG/1
30 TABLET ORAL DAILY
Qty: 15 TABLET | Refills: 0 | Status: SHIPPED | OUTPATIENT
Start: 2025-01-20 | End: 2025-01-25

## 2025-01-20 RX ORDER — IPRATROPIUM BROMIDE AND ALBUTEROL SULFATE 2.5; .5 MG/3ML; MG/3ML
3 SOLUTION RESPIRATORY (INHALATION)
Status: COMPLETED | OUTPATIENT
Start: 2025-01-20 | End: 2025-01-20

## 2025-01-20 RX ORDER — PROMETHAZINE HYDROCHLORIDE AND DEXTROMETHORPHAN HYDROBROMIDE 6.25; 15 MG/5ML; MG/5ML
5 SYRUP ORAL
Qty: 120 ML | Refills: 0 | Status: SHIPPED | OUTPATIENT
Start: 2025-01-20

## 2025-01-20 RX ADMIN — IPRATROPIUM BROMIDE AND ALBUTEROL SULFATE 3 ML: 2.5; .5 SOLUTION RESPIRATORY (INHALATION) at 01:01

## 2025-01-20 RX ADMIN — DEXAMETHASONE SODIUM PHOSPHATE 10 MG: 10 INJECTION INTRAMUSCULAR; INTRAVENOUS at 01:01
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
## 2025-01-20 NOTE — PATIENT INSTRUCTIONS
Instructions:            Acute bronchitis is swelling and irritation in your lungs. It is usually caused by a virus and most often happens in the winter. Bronchitis may also be caused by bacteria or by a chemical irritant, such as smoke.    DISCHARGE INSTRUCTIONS:  Return to the emergency department if:  You cough up blood.  Your lips or fingernails turn blue.  You feel like you are not getting enough air when you breathe.    Call your doctor if:  Your symptoms do not go away or get worse, even after treatment.  Your cough does not get better within 4 weeks.  You have questions or concerns about your condition or care.    Medicines:  You may need any of the following:    Cough suppressants decrease your urge to cough.    Decongestants help loosen mucus in your lungs and make it easier to cough up. This can help you breathe easier.    Inhalers may be given. Your healthcare provider may give you one or more inhalers to help you breathe easier and cough less. An inhaler gives your medicine to open your airways. Ask your healthcare provider to show you how to use your inhaler correctly.    Metered Dose Inhaler    Antibiotics may be given for up to 10 days if your bronchitis is caused by bacteria.    Acetaminophen decreases pain and fever. It is available without a doctor's order. Ask how much to take and how often to take it. Follow directions. Read the labels of all other medicines you are using to see if they also contain acetaminophen, or ask your doctor or pharmacist. Acetaminophen can cause liver damage if not taken correctly.  NSAIDs help decrease swelling and pain or fever. This medicine is available with or without a doctor's order. NSAIDs can cause stomach bleeding or kidney problems in certain people. If you take blood thinner medicine, always ask your healthcare provider if NSAIDs are safe for you. Always read the medicine label and follow directions.  Take your medicine as directed. Contact your healthcare  provider if you think your medicine is not helping or if you have side effects. Tell your provider if you are allergic to any medicine. Keep a list of the medicines, vitamins, and herbs you take. Include the amounts, and when and why you take them. Bring the list or the pill bottles to follow-up visits. Carry your medicine list with you in case of an emergency.    Self-care:  Drink liquids as directed. You may need to drink more liquids than usual to stay hydrated. Ask how much liquid to drink each day and which liquids are best for you.  Use a cool mist humidifier to increase air moisture in your home. This may make it easier for you to breathe and help decrease your cough.  Get more rest. Rest helps your body to heal. Slowly start to do more each day. Rest when you feel it is needed.  Avoid irritants in the air. Avoid chemicals, fumes, and dust. Wear a face mask if you must work around dust or fumes. Stay inside on days when air pollution levels are high. If you have allergies, stay inside when pollen counts are high. Do not use aerosol products, such as spray-on deodorant, bug spray, and hair spray.  Do not smoke or be around others who are smoking. Nicotine and other chemicals in cigarettes and cigars can cause lung damage. Ask your healthcare provider for information if you currently smoke and need help to quit. E-cigarettes or smokeless tobacco still contain nicotine. Talk to your healthcare provider before you use these products.    Ask about vaccines you may need. Get a flu vaccine each year as soon as recommended, usually in September or October. Ask your healthcare provider if you should also get a pneumonia or COVID-19 vaccine. Your healthcare provider can tell you if you should also get other vaccines, and when to get them.    Prevent the spread of germs. You can decrease your risk for acute bronchitis and other illnesses by doing the following:  Wash your hands often with soap and water. Carry  germ-killing hand lotion or gel with you. You can use the lotion or gel to clean your hands when soap and water are not available.  Handwashing  Do not touch your eyes, nose, or mouth unless you have washed your hands first.  Always cover your mouth when you cough to prevent the spread of germs. It is best to cough into a tissue or your shirt sleeve instead of into your hand. Ask those around you to cover their mouths when they cough.  Try to avoid people who have a cold or the flu. If you are sick, stay away from others as much as possible.

## 2025-01-28 ENCOUNTER — OFFICE VISIT (OUTPATIENT)
Dept: GYNECOLOGY | Facility: CLINIC | Age: 60
End: 2025-01-28
Payer: MEDICARE

## 2025-01-28 VITALS
RESPIRATION RATE: 18 BRPM | BODY MASS INDEX: 23.93 KG/M2 | DIASTOLIC BLOOD PRESSURE: 88 MMHG | SYSTOLIC BLOOD PRESSURE: 139 MMHG | HEART RATE: 99 BPM | WEIGHT: 152.5 LBS | HEIGHT: 67 IN | TEMPERATURE: 98 F | OXYGEN SATURATION: 98 %

## 2025-01-28 DIAGNOSIS — Z01.419 WOMEN'S ANNUAL ROUTINE GYNECOLOGICAL EXAMINATION: Primary | ICD-10-CM

## 2025-01-28 DIAGNOSIS — Z12.31 SCREENING MAMMOGRAM FOR BREAST CANCER: ICD-10-CM

## 2025-01-28 DIAGNOSIS — N95.2 ATROPHIC VAGINITIS: ICD-10-CM

## 2025-01-28 PROCEDURE — G0101 CA SCREEN;PELVIC/BREAST EXAM: HCPCS | Mod: S$PBB,,, | Performed by: NURSE PRACTITIONER

## 2025-01-28 PROCEDURE — 3075F SYST BP GE 130 - 139MM HG: CPT | Mod: CPTII,,, | Performed by: NURSE PRACTITIONER

## 2025-01-28 PROCEDURE — 99215 OFFICE O/P EST HI 40 MIN: CPT | Mod: PBBFAC | Performed by: NURSE PRACTITIONER

## 2025-01-28 PROCEDURE — 3079F DIAST BP 80-89 MM HG: CPT | Mod: CPTII,,, | Performed by: NURSE PRACTITIONER

## 2025-01-28 PROCEDURE — 3008F BODY MASS INDEX DOCD: CPT | Mod: CPTII,,, | Performed by: NURSE PRACTITIONER

## 2025-01-28 PROCEDURE — 1160F RVW MEDS BY RX/DR IN RCRD: CPT | Mod: CPTII,,, | Performed by: NURSE PRACTITIONER

## 2025-01-28 PROCEDURE — 1159F MED LIST DOCD IN RCRD: CPT | Mod: CPTII,,, | Performed by: NURSE PRACTITIONER

## 2025-01-28 RX ORDER — ESTRADIOL 0.1 MG/G
1 CREAM VAGINAL
Qty: 42.5 G | Refills: 2 | Status: SHIPPED | OUTPATIENT
Start: 2025-01-30 | End: 2026-01-30

## 2025-01-28 NOTE — PROGRESS NOTES
"Patient ID: Adelina Rosales is a 59 y.o. female.    Chief Complaint: Gynecologic Exam             HPI:  Pt is  (hx of spontaneous  x 4) here for annual gyn exam. Denies hx of abnormal pap. Hx of partial hysterectomy secondary to symptomatic uterine fibroids. Denies vaginal bleeding or discharge. Denies abd/pelvic pain. Denies breast complaints. Pt is followed in medicine clinic for primary care, endocrine for pituitary adenoma, and [facility] for  bipolar/depression. States is controlled on meds. Denies SI/HI.She uses vaginal estrogen for atrophic vaginitis.Denies dryness/dyspareunia.  NO gyn complaints. Pt states had colonoscopy yesterday 2025 with Dr. Suresh.     Review of Systems:   Negative except for findings in HPI     Objective:   /88   Pulse 99   Temp 98.2 °F (36.8 °C) (Oral)   Resp 18   Ht 5' 7" (1.702 m)   Wt 69.2 kg (152 lb 8 oz)   SpO2 98%   BMI 23.88 kg/m²    Physical Exam:  GENERAL: Pt is aware and alert and  in no acute distress.  BREASTS: Bilateral-No masses, nipple discharge, skin changes, or tenderness.  ABDOMEN: Soft, non tender.  VULVA:  No lesions or skin changes.  URETHRA: No lesions  BLADDER: No tenderness.  VAGINA: Mucosa atrophic,no discharge; no lesions.  CERVIX:  absent  BIMANUAL EXAM:  The uterus is absent.  Danial adnexa reveal no evidence of masses; no fullness   SKIN: Warm and Dry  PSYCHIATRIC: Patient is awake and alert. Mood and affect are normal.  Assessment:   Women's annual routine gynecological examination    Atrophic vaginitis  -     estradioL (ESTRACE) 0.01 % (0.1 mg/gram) vaginal cream; Place 1 g vaginally twice a week.  Dispense: 42.5 g; Refill: 2    Screening mammogram for breast cancer  -     Mammo Digital Screening Bilat w/ Jaime; Future; Expected date: 2025            1. Women's annual routine gynecological examination    2. Atrophic vaginitis    3. Screening mammogram for breast cancer             -pap not indicated secondary " to hysterectomy for benign conditions    Plan:       Follow up in about 1 year (around 1/28/2026).

## 2025-01-29 DIAGNOSIS — D35.2 PITUITARY MICROADENOMA WITH HYPERPROLACTINEMIA: ICD-10-CM

## 2025-01-29 DIAGNOSIS — E22.9 PITUITARY MICROADENOMA WITH HYPERPROLACTINEMIA: ICD-10-CM

## 2025-01-30 RX ORDER — CABERGOLINE 0.5 MG/1
TABLET ORAL
Qty: 8 TABLET | Refills: 3 | Status: SHIPPED | OUTPATIENT
Start: 2025-01-30

## 2025-01-31 DIAGNOSIS — F48.9 TENSION: ICD-10-CM

## 2025-01-31 RX ORDER — TIZANIDINE 2 MG/1
2 TABLET ORAL 3 TIMES DAILY PRN
Qty: 30 TABLET | Refills: 8 | Status: SHIPPED | OUTPATIENT
Start: 2025-01-31

## 2025-03-06 ENCOUNTER — LAB VISIT (OUTPATIENT)
Dept: LAB | Facility: HOSPITAL | Age: 60
End: 2025-03-06
Attending: INTERNAL MEDICINE
Payer: COMMERCIAL

## 2025-03-06 DIAGNOSIS — R14.3 FLATULENCE, ERUCTATION, AND GAS PAIN: ICD-10-CM

## 2025-03-06 DIAGNOSIS — R14.1 FLATULENCE, ERUCTATION, AND GAS PAIN: ICD-10-CM

## 2025-03-06 DIAGNOSIS — K52.89 SOAP COLITIS: ICD-10-CM

## 2025-03-06 DIAGNOSIS — T47.4X5A SOAP COLITIS: ICD-10-CM

## 2025-03-06 DIAGNOSIS — K76.0 FATTY METAMORPHOSIS OF LIVER: Primary | ICD-10-CM

## 2025-03-06 DIAGNOSIS — R10.84 ABDOMINAL PAIN, GENERALIZED: ICD-10-CM

## 2025-03-06 DIAGNOSIS — R14.2 FLATULENCE, ERUCTATION, AND GAS PAIN: ICD-10-CM

## 2025-03-06 LAB
ALBUMIN SERPL-MCNC: 4.3 G/DL (ref 3.4–4.8)
ALP SERPL-CCNC: 92 UNIT/L (ref 40–150)
ALT SERPL-CCNC: 30 UNIT/L (ref 0–55)
AST SERPL-CCNC: 26 UNIT/L (ref 5–34)
BILIRUB DIRECT SERPL-MCNC: 0.3 MG/DL (ref 0–?)
BILIRUB INDIRECT SERPL-MCNC: 0.5 MG/DL (ref 0–0.8)
BILIRUB SERPL-MCNC: 0.8 MG/DL
IGA SERPL-MCNC: 193 MG/DL (ref 69–517)
PROT SERPL-MCNC: 7.7 GM/DL (ref 5.8–7.6)
T4 SERPL-MCNC: 6.5 UG/DL (ref 4.87–11.72)
TSH SERPL-ACNC: 1.34 UIU/ML (ref 0.35–4.94)

## 2025-03-06 PROCEDURE — 84481 FREE ASSAY (FT-3): CPT

## 2025-03-06 PROCEDURE — 82784 ASSAY IGA/IGD/IGG/IGM EACH: CPT

## 2025-03-06 PROCEDURE — 86258 DGP ANTIBODY EACH IG CLASS: CPT

## 2025-03-06 PROCEDURE — 86231 EMA EACH IG CLASS: CPT

## 2025-03-06 PROCEDURE — 84436 ASSAY OF TOTAL THYROXINE: CPT

## 2025-03-06 PROCEDURE — 86364 TISS TRNSGLTMNASE EA IG CLAS: CPT

## 2025-03-06 PROCEDURE — 80076 HEPATIC FUNCTION PANEL: CPT

## 2025-03-06 PROCEDURE — 84443 ASSAY THYROID STIM HORMONE: CPT

## 2025-03-06 PROCEDURE — 36415 COLL VENOUS BLD VENIPUNCTURE: CPT

## 2025-03-06 PROCEDURE — 86364 TISS TRNSGLTMNASE EA IG CLAS: CPT | Mod: 91

## 2025-03-06 PROCEDURE — 0003M LIVER DIS 10 ASSAYS W/NASH: CPT

## 2025-03-08 LAB
ELIA GLIADINDP IGA QUANTITATIVE: 0.7 U/ML
ELIA GLIADINDP IGG QUANTITATIVE: <0.6 U/ML
TTG IGA SER IA-ACNC: <1.2 U/ML

## 2025-03-10 LAB
A2 MACROGLOB SERPL-MCNC: 174 MG/DL (ref 100–280)
ALT SERPL W P-5'-P-CCNC: 33 U/L (ref 7–45)
ANNOTATION COMMENT IMP: ABNORMAL
APO A-I SERPL-MCNC: 151 MG/DL
AST SERPL W P-5'-P-CCNC: 30 U/L (ref 8–43)
BILIRUB SERPL-MCNC: 0.7 MG/DL (ref 0–1.2)
CHOLEST SERPL-MCNC: 137 MG/DL
FIBROSIS STAGE SERPL QL: ABNORMAL
GGT SERPL-CCNC: 28 U/L (ref 5–36)
GLUCOSE P FAST SERPL-MCNC: 107 MG/DL (ref 70–100)
HAPTOGLOB SERPL NEPH-MCNC: 145 MG/DL (ref 30–200)
IGA SERPL-MCNC: 153 MG/DL (ref 61–356)
IMMUNOLOGIST REVIEW: NORMAL
LIVER FIBR SCORE SERPL CALC.FIBROSURE: 0.2
LIVER FIBROSIS INTERPRETATION SER-IMP: ABNORMAL
LIVER STEATOSIS GRADE SERPL QL: ABNORMAL
LIVER STEATOSIS INTERP SERPL-IMP: ABNORMAL
LIVER STEATOSIS SCORE SERPL: 0.63
NASH GRADE SERPL QL: ABNORMAL
NASH INTERPRETATION SERPL-IMP: ABNORMAL
NASH SCORE SERPL: 0.7
SERIAL #: ABNORMAL
T3FREE SERPL-MCNC: NORMAL PG/ML
TRIGL SERPL-MCNC: 189 MG/DL
TTG IGA SER IA-ACNC: <1.2 U/ML

## 2025-03-11 LAB
ENDOMYSIUM IGA SER QL IF: NEGATIVE
PATH REV: NORMAL

## 2025-03-13 ENCOUNTER — APPOINTMENT (OUTPATIENT)
Dept: LAB | Facility: HOSPITAL | Age: 60
End: 2025-03-13
Attending: INTERNAL MEDICINE
Payer: COMMERCIAL

## 2025-03-13 PROCEDURE — 82653 EL-1 FECAL QUANTITATIVE: CPT

## 2025-04-03 ENCOUNTER — TELEPHONE (OUTPATIENT)
Dept: ENDOCRINOLOGY | Facility: CLINIC | Age: 60
End: 2025-04-03
Payer: COMMERCIAL

## 2025-04-03 ENCOUNTER — TELEPHONE (OUTPATIENT)
Dept: NEUROLOGY | Facility: CLINIC | Age: 60
End: 2025-04-03
Payer: COMMERCIAL

## 2025-04-03 DIAGNOSIS — G43.709 CHRONIC MIGRAINE WITHOUT AURA WITHOUT STATUS MIGRAINOSUS, NOT INTRACTABLE: ICD-10-CM

## 2025-04-03 DIAGNOSIS — G43.709 CHRONIC MIGRAINE WITHOUT AURA, NOT INTRACTABLE, WITHOUT STATUS MIGRAINOSUS: Primary | ICD-10-CM

## 2025-04-03 RX ORDER — SUMATRIPTAN SUCCINATE 100 MG/1
100 TABLET ORAL 2 TIMES DAILY PRN
Qty: 12 TABLET | Refills: 3 | Status: SHIPPED | OUTPATIENT
Start: 2025-04-03 | End: 2026-04-03

## 2025-04-03 RX ORDER — KETOROLAC TROMETHAMINE 10 MG/1
10 TABLET, FILM COATED ORAL EVERY 6 HOURS
Qty: 20 TABLET | Refills: 0 | Status: SHIPPED | OUTPATIENT
Start: 2025-04-03 | End: 2025-04-08

## 2025-04-03 NOTE — TELEPHONE ENCOUNTER
Patient states provider advised her to call in when she start to have frequent headaches. She has been having them for 2 weeks.

## 2025-04-03 NOTE — TELEPHONE ENCOUNTER
----- Message from Brenda sent at 4/3/2025  1:28 PM CDT -----  Regarding: please advise  Pt is in need of a call in regards to headaches she been having for the past 2 weeks. Pt cna be reached at [phone] You

## 2025-04-03 NOTE — TELEPHONE ENCOUNTER
----- Message from Marzena sent at 4/3/2025 11:56 AM CDT -----  Pt gianluca Ferguson called requesting a call back.Pt call back number  [phone] advise

## 2025-04-03 NOTE — TELEPHONE ENCOUNTER
I sent in a prescription for ketorolac to be taken every 6 hrs x 5 days. She is not to take Aleve, aspirin, BC Powder or Ibuprofen while taking this medication.  I also refilled her sumatriptan.

## 2025-04-03 NOTE — TELEPHONE ENCOUNTER
Patient called requesting to be put on cancellation list informed patient provider will be out for next few weeks and will not be able to be seen until after her leave if any increase symptoms informed to report to ED

## 2025-04-07 ENCOUNTER — LAB VISIT (OUTPATIENT)
Dept: LAB | Facility: HOSPITAL | Age: 60
End: 2025-04-07
Attending: INTERNAL MEDICINE
Payer: COMMERCIAL

## 2025-04-07 ENCOUNTER — TELEPHONE (OUTPATIENT)
Dept: ENDOCRINOLOGY | Facility: CLINIC | Age: 60
End: 2025-04-07

## 2025-04-07 ENCOUNTER — OFFICE VISIT (OUTPATIENT)
Dept: ENDOCRINOLOGY | Facility: CLINIC | Age: 60
End: 2025-04-07
Payer: COMMERCIAL

## 2025-04-07 VITALS
BODY MASS INDEX: 28.07 KG/M2 | TEMPERATURE: 99 F | RESPIRATION RATE: 16 BRPM | SYSTOLIC BLOOD PRESSURE: 136 MMHG | HEIGHT: 67 IN | WEIGHT: 178.81 LBS | HEART RATE: 18 BPM | DIASTOLIC BLOOD PRESSURE: 92 MMHG

## 2025-04-07 DIAGNOSIS — D35.2 MICROPROLACTINOMA: Primary | ICD-10-CM

## 2025-04-07 DIAGNOSIS — E04.2 MULTINODULAR GOITER: ICD-10-CM

## 2025-04-07 DIAGNOSIS — D35.2 MICROPROLACTINOMA: ICD-10-CM

## 2025-04-07 LAB
ANION GAP SERPL CALC-SCNC: -2 MEQ/L
BUN SERPL-MCNC: 16.4 MG/DL (ref 9.8–20.1)
CALCIUM SERPL-MCNC: 9.8 MG/DL (ref 8.4–10.2)
CHLORIDE SERPL-SCNC: 114 MMOL/L (ref 98–107)
CO2 SERPL-SCNC: 27 MMOL/L (ref 23–31)
CREAT SERPL-MCNC: 0.87 MG/DL (ref 0.55–1.02)
CREAT/UREA NIT SERPL: 19
GFR SERPLBLD CREATININE-BSD FMLA CKD-EPI: >60 ML/MIN/1.73/M2
GLUCOSE SERPL-MCNC: 90 MG/DL (ref 82–115)
POTASSIUM SERPL-SCNC: 4.8 MMOL/L (ref 3.5–5.1)
PROLACTIN LEVEL (OLG): 11.96 NG/ML (ref 5.18–26.53)
SODIUM SERPL-SCNC: 139 MMOL/L (ref 136–145)

## 2025-04-07 PROCEDURE — 80048 BASIC METABOLIC PNL TOTAL CA: CPT

## 2025-04-07 PROCEDURE — 99215 OFFICE O/P EST HI 40 MIN: CPT | Mod: PBBFAC | Performed by: INTERNAL MEDICINE

## 2025-04-07 PROCEDURE — 84146 ASSAY OF PROLACTIN: CPT

## 2025-04-07 PROCEDURE — 36415 COLL VENOUS BLD VENIPUNCTURE: CPT

## 2025-04-07 NOTE — PROGRESS NOTES
Subjective     Patient ID: Adelina Rosales is a 60 y.o. female.    Chief Complaint: Pituitary microadenomaw/hyperprolactinemia    Endocrine clinic note 08/11/2022: 51-year-old female scheduled today as new patient referral to endocrine clinic for pituitary microadenoma and hyperprolactinemia.  Patient had MRI head MRI 06/21/20221. Suggestion of a left pituitary 0.2 cm microadenoma.  Patient previously had a history of migraine headaches but states that time she has a headache that is in the center of her forehead between her eyes has occurred more frequently.  Patient was found to have elevated prolactin level of 70.90 previous prolactin levels were 54.04 and  21.32 years ago.  Patient denies any galactorrhea or breast fullness.  Patient is postmenopausal and has not had cycles and almost 9 years.  Will complete workup to start cabergoline due to elevated prolactin level.     Endocrine clinic note 3/30/2023:  58-year-old female scheduled today for endocrine clinic follow-up.  History of pituitary microadenoma and hyperprolactinemia.  On initial workup prolactin level 23.59, IGF-1 97, cortisol 6.2, acth 9.1, TSH 1.5533, free T4 1.09  [place]ne stim test baseline cortisol 6.2, 1/2 hour 9.1, 1 hour 17.5 (by below cutoff of 18).  Patient had decrease in prolactin level.  We will repeat today to ensure patient's prolactin level is normal.  Patient reports increased amount of headaches that are directly in the front of her head to wean her eyes that has become increasingly worse and happens 5-6 times per week patient denies headaches in any other part of her head patient denies galactorrhea.  MRI 06/21/2022 1. Suggestion of a left pituitary 0.2 cm microadenoma.      Endocrine clinic note 10/12/2023:  58-year-old female scheduled today for endocrine clinic follow-up.  History of pituitary microadenoma and hyperprolactinemia. On initial workup prolactin level 23.59, IGF-1 97, cortisol 6.2, acth 9.1, TSH 1.5533, free T4 1.09,  Natasha stim test baseline cortisol 6.2, 1/2 hour 9.1, 1 hour 17.5 (by below cutoff of 18), Repeat natasha stem 9.1, 17.5 and 1 hour 22.7 on 08/19/2022. Night time salivary normal x 2 on 05/03/2023, Dex suppression test cortisol 1.1 on 04/21/2023, Prolactin 110.83 on 04/17/2023 pt also on Mellaril and Latuda giving , mixed picture if related to pituitary microadenoma or the patient's medications.  Patient was previously having headaches referred to neuro clinic for treatment of headaches, patient is currently being treated by neuro for her headaches.  Patient denies galactorrhea. Multinodular goiter US 04/20/2023 largest nodule 1.1 cm x 8 mm x 1 cm Tir-rads 4.  Patient denies any palpable nodules.      Endocrine clinic note 10/25/2024:  59-year-old female scheduled today for endocrine clinic follow-up.  History of pituitary adenoma and hyperprolactinemia. On initial workup prolactin level 23.59, IGF-1 97, cortisol 6.2, acth 9.1, TSH 1.5533, free T4 1.09, Natasha stim test baseline cortisol 6.2, 1/2 hour 9.1, 1 hour 17.5 (by below cutoff of 18), Repeat natasha stem 9.1, 17.5 and 1 hour 22.7 on 08/19/2022. Night time salivary normal x 2 on 05/03/2023, Dex suppression test cortisol 1.1 on 04/21/2023, Prolactin 110.83 on 04/17/2023 pt also on Mellaril and Latuda giving , mixed picture if related to pituitary microadenoma or the patient's medications. Multinodular goiter US 04/20/2023 largest nodule 1.1 cm x 8 mm x 1 cm Tir-rads 4.  Patient denies any palpable nodules.  Patient reports she is currently on cabergoline 1 tablet twice a day week.  She denies any galactorrhea.  After being seen in neuro patient states that her headaches have been better and she only takes her Imitrex as needed and denies any recent headaches.    Endocrine clinic note 4/7/2025: Two weeks of frontal and parietal constant headache with intermittent blurring of vision associated with occasional photophobia and one day history of nausea and vomiting after  taking PO Ketorolac prescribed by neurology. She also took PRN Sumatriptan only with temporary relief. She says her headache felt like migraines. She follows with neurology for her chronic migraine. Takes cabergoline 0.5 twice a week. MRI from 2023: Punctate area of relative decreased enhancement in the pituitary on the left side measuring 1.8 mm consistent with pituitary micro adenoma. Prolactin from 10/2/2024 was 6.03.                  Review of Systems       Objective     Physical Exam  Constitutional:       General: She is not in acute distress.     Appearance: Normal appearance. She is not ill-appearing.   HENT:      Head: Normocephalic and atraumatic.      Right Ear: External ear normal.      Left Ear: External ear normal.      Nose: Nose normal. No congestion or rhinorrhea.      Mouth/Throat:      Mouth: Mucous membranes are moist.      Pharynx: Oropharynx is clear. No oropharyngeal exudate.   Eyes:      General:         Right eye: No discharge.         Left eye: No discharge.      Conjunctiva/sclera: Conjunctivae normal.      Pupils: Pupils are equal, round, and reactive to light.   Neck:      Thyroid: No thyroid mass, thyromegaly or thyroid tenderness.   Cardiovascular:      Rate and Rhythm: Normal rate and regular rhythm.      Pulses: Normal pulses.      Heart sounds: Normal heart sounds. No murmur heard.  Pulmonary:      Effort: Pulmonary effort is normal. No respiratory distress.      Breath sounds: Normal breath sounds.   Abdominal:      General: Abdomen is flat. Bowel sounds are normal. There is no distension.      Palpations: Abdomen is soft.      Tenderness: There is no abdominal tenderness.   Musculoskeletal:         General: No swelling or tenderness. Normal range of motion.      Cervical back: Normal range of motion and neck supple. No tenderness.      Right lower leg: No edema.      Left lower leg: No edema.   Feet:      Right foot:      Skin integrity: Skin integrity normal.      Left foot:       Skin integrity: Skin integrity normal.   Lymphadenopathy:      Cervical: No cervical adenopathy.   Skin:     General: Skin is warm and dry.      Coloration: Skin is not jaundiced or pale.   Neurological:      General: No focal deficit present.      Mental Status: She is alert and oriented to person, place, and time. Mental status is at baseline.      Coordination: Coordination normal.      Gait: Gait normal.   Psychiatric:         Mood and Affect: Mood normal.         Behavior: Behavior normal.         Thought Content: Thought content normal.            Assessment and Plan     1. Pituitary microadenoma with hyperprolactinemia  -MRI from 2023: Punctate area of relative decreased enhancement in the pituitary on the left side measuring 1.8 mm consistent with pituitary micro adenoma  -On initial workup prolactin level 23.59, IGF-1 97, cortisol 6.2, acth 9.1, TSH 1.5533, free T4 1.09  -Natasha stim test baseline cortisol 6.2, 1/2 hour 9.1, 1 hour 17.5 (by below cutoff of 18)  -Repeat natasha stem 9.1, 17.5 and 1 hour 22.7 on 08/19/2022   -Night time salivary normal x 2 on 05/03/2023   -Dex suppression test cortisol 1.1 on 04/21/2023  -Prolactin 110.83 on 04/17/2023 pt also on Latuda previously on Mellaril stopped over 1 year ago   -Prolactin level 6.03 on 10/02/2024  -On Cabergoline 0.5 mg twice a week  -Repeat prolactin, BMP  -Follow up with neurology for headaches, unlikely from prolactinoma with this size, she would like to defer MRI due to claustrophobia    2. Multinodular goiter  -US 04/20/2023 largest nodule 1.1 cm x 8 mm x 1 cm Tir-rads 4   -US 04/25/2024 right nodule increase to 1.4 cm from previous 1.1 cm TI-RADS 3  -TSH 03/06/25 1.345, FT3 3.2, T4 6.5  -Repeat thyroid US      Follow up in about 6 months (around 10/7/2025).    Fabiana Mcrae MD  [facility] PGY-III

## 2025-04-14 ENCOUNTER — OFFICE VISIT (OUTPATIENT)
Dept: INTERNAL MEDICINE | Facility: CLINIC | Age: 60
End: 2025-04-14
Payer: COMMERCIAL

## 2025-04-14 ENCOUNTER — TELEPHONE (OUTPATIENT)
Dept: GASTROENTEROLOGY | Facility: CLINIC | Age: 60
End: 2025-04-14
Payer: COMMERCIAL

## 2025-04-14 VITALS
DIASTOLIC BLOOD PRESSURE: 86 MMHG | BODY MASS INDEX: 28.38 KG/M2 | RESPIRATION RATE: 20 BRPM | HEIGHT: 67 IN | SYSTOLIC BLOOD PRESSURE: 122 MMHG | OXYGEN SATURATION: 99 % | HEART RATE: 88 BPM | TEMPERATURE: 98 F | WEIGHT: 180.81 LBS

## 2025-04-14 DIAGNOSIS — K21.9 GASTROESOPHAGEAL REFLUX DISEASE, UNSPECIFIED WHETHER ESOPHAGITIS PRESENT: ICD-10-CM

## 2025-04-14 DIAGNOSIS — I10 HYPERTENSION, UNSPECIFIED TYPE: ICD-10-CM

## 2025-04-14 DIAGNOSIS — J06.9 UPPER RESPIRATORY TRACT INFECTION, UNSPECIFIED TYPE: Primary | ICD-10-CM

## 2025-04-14 DIAGNOSIS — D35.2 PITUITARY ADENOMA: ICD-10-CM

## 2025-04-14 DIAGNOSIS — E78.5 HYPERLIPIDEMIA, UNSPECIFIED HYPERLIPIDEMIA TYPE: ICD-10-CM

## 2025-04-14 PROCEDURE — 99214 OFFICE O/P EST MOD 30 MIN: CPT | Mod: PBBFAC

## 2025-04-14 RX ORDER — ALBUTEROL SULFATE 90 UG/1
2 INHALANT RESPIRATORY (INHALATION) EVERY 4 HOURS PRN
Qty: 18 G | Refills: 0 | Status: SHIPPED | OUTPATIENT
Start: 2025-04-14 | End: 2025-04-21

## 2025-04-14 NOTE — TELEPHONE ENCOUNTER
----- Message from Tayler sent at 4/14/2025 12:33 PM CDT -----  Regarding: Pt seeing Dr. Suresh  Called pt for reminder of upcoming EGD. Pt stated to cancel appt's she is now seeing Dr. Suresh in [place] [employer]. She has already had upper and lower scope with him. I canceled EGD and f/u with Evan LEWIS

## 2025-04-14 NOTE — PROGRESS NOTES
I saw and evaluated the patient and was present for the key portions of the exam and separately billed procedures, if any. I have reveiwed and agree with the resident's findings, including all diagnostic interpretations and plans as written.
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
## 2025-04-16 DIAGNOSIS — D35.2 PITUITARY MICROADENOMA WITH HYPERPROLACTINEMIA: ICD-10-CM

## 2025-04-16 DIAGNOSIS — E22.9 PITUITARY MICROADENOMA WITH HYPERPROLACTINEMIA: ICD-10-CM

## 2025-04-20 DIAGNOSIS — K21.9 GASTROESOPHAGEAL REFLUX DISEASE, UNSPECIFIED WHETHER ESOPHAGITIS PRESENT: ICD-10-CM

## 2025-04-20 DIAGNOSIS — R14.0 BLOATING SYMPTOM: ICD-10-CM

## 2025-04-20 DIAGNOSIS — R10.13 EPIGASTRIC PAIN: ICD-10-CM

## 2025-04-21 RX ORDER — FAMOTIDINE 40 MG/1
TABLET, FILM COATED ORAL
Qty: 30 TABLET | Refills: 5 | OUTPATIENT
Start: 2025-04-21

## 2025-04-22 ENCOUNTER — HOSPITAL ENCOUNTER (OUTPATIENT)
Dept: RADIOLOGY | Facility: HOSPITAL | Age: 60
Discharge: HOME | End: 2025-04-22
Attending: INTERNAL MEDICINE
Payer: COMMERCIAL

## 2025-04-22 DIAGNOSIS — E04.2 MULTINODULAR GOITER: ICD-10-CM

## 2025-04-22 DIAGNOSIS — D35.2 MICROPROLACTINOMA: ICD-10-CM

## 2025-04-22 PROCEDURE — 76536 US EXAM OF HEAD AND NECK: CPT | Mod: TC

## 2025-04-25 ENCOUNTER — TELEPHONE (OUTPATIENT)
Dept: ENDOCRINOLOGY | Facility: CLINIC | Age: 60
End: 2025-04-25
Payer: COMMERCIAL

## 2025-04-25 NOTE — TELEPHONE ENCOUNTER
----- Message from Marzena sent at 4/25/2025  1:23 PM CDT -----  Pt of  called stating she been waiting on a call back with her ultrasound results.Pt requesting a call back [phone] advise

## 2025-04-25 NOTE — TELEPHONE ENCOUNTER
Spoke with patient informed that Dr. Silverio will review and we will in turn contact her with results, Voiced her understanding.

## 2025-04-28 RX ORDER — CABERGOLINE 0.5 MG/1
TABLET ORAL
Qty: 8 TABLET | Refills: 11 | Status: SHIPPED | OUTPATIENT
Start: 2025-04-28

## 2025-04-28 NOTE — TELEPHONE ENCOUNTER
Labs and u/s are back.    Labs show her microprolactinoma is controlled with the cabergoline.  I have renewed it for her to continue.    U/s showed a MNG not meeting FNA criteria which can continue to be followed via u/s in the coming years.    For now, keep f/u as planned with prl, BMP prior to regroup.

## 2025-04-29 NOTE — TELEPHONE ENCOUNTER
Spoke with patient informed labs show her microprolactinoma is controlled with the cabergoline refills added, Ultrasound showed a MNG not meeting FNA criteria which can continue to be followed via u/s in the coming years.  Keep October follow-up with prl, BMP prior.

## 2025-05-15 DIAGNOSIS — K21.9 GASTROESOPHAGEAL REFLUX DISEASE, UNSPECIFIED WHETHER ESOPHAGITIS PRESENT: ICD-10-CM

## 2025-05-19 DIAGNOSIS — K21.9 GASTROESOPHAGEAL REFLUX DISEASE, UNSPECIFIED WHETHER ESOPHAGITIS PRESENT: ICD-10-CM

## 2025-05-23 ENCOUNTER — TELEPHONE (OUTPATIENT)
Dept: INTERNAL MEDICINE | Facility: CLINIC | Age: 60
End: 2025-05-23
Payer: COMMERCIAL

## 2025-05-26 RX ORDER — OMEPRAZOLE 20 MG/1
40 CAPSULE, DELAYED RELEASE ORAL DAILY
Qty: 180 CAPSULE | Refills: 3 | Status: SHIPPED | OUTPATIENT
Start: 2025-05-26 | End: 2026-05-26

## 2025-05-26 RX ORDER — OMEPRAZOLE 20 MG/1
40 CAPSULE, DELAYED RELEASE ORAL DAILY
Qty: 180 CAPSULE | Refills: 3 | OUTPATIENT
Start: 2025-05-26 | End: 2026-05-26

## 2025-05-26 NOTE — TELEPHONE ENCOUNTER
It appears her last prescription was filled on May 19 for a 3 month supply (30 tabs x2 refills) by her GI provider, Dr. Suresh

## 2025-05-31 DIAGNOSIS — K21.9 GASTROESOPHAGEAL REFLUX DISEASE, UNSPECIFIED WHETHER ESOPHAGITIS PRESENT: Primary | ICD-10-CM

## 2025-05-31 DIAGNOSIS — R10.13 EPIGASTRIC PAIN: ICD-10-CM

## 2025-05-31 DIAGNOSIS — R14.0 BLOATING SYMPTOM: ICD-10-CM

## 2025-06-02 ENCOUNTER — PATIENT MESSAGE (OUTPATIENT)
Dept: GASTROENTEROLOGY | Facility: CLINIC | Age: 60
End: 2025-06-02
Payer: COMMERCIAL

## 2025-06-02 RX ORDER — FAMOTIDINE 40 MG/1
TABLET, FILM COATED ORAL
Qty: 30 TABLET | Refills: 5 | Status: SHIPPED | OUTPATIENT
Start: 2025-06-02

## 2025-06-09 DIAGNOSIS — D35.2 PITUITARY MICROADENOMA WITH HYPERPROLACTINEMIA: ICD-10-CM

## 2025-06-09 DIAGNOSIS — E22.9 PITUITARY MICROADENOMA WITH HYPERPROLACTINEMIA: ICD-10-CM

## 2025-06-09 RX ORDER — CABERGOLINE 0.5 MG/1
TABLET ORAL
Qty: 8 TABLET | Refills: 11 | Status: SHIPPED | OUTPATIENT
Start: 2025-06-09

## 2025-06-09 NOTE — TELEPHONE ENCOUNTER
Called explained that on 4/28/25 Cabergoline #8 with 11 refills was sent to [facility] in [place].  Call placed to [facility] in [place] they are telling me the last erx they have for Cabergoline is from 1/30/25 the do not see a prescription from 4/28/25.

## 2025-06-09 NOTE — TELEPHONE ENCOUNTER
----- Message from Marzena sent at 6/9/2025 10:56 AM CDT -----  Pt of  called stating the pharmacy told her she need refills on this medication cabergoline. Pt number [phone] Please advise  ----- Message -----  From: Karime Escobar LPN  Sent: 6/9/2025  10:50 AM CDT  To: Marzena Chase    Patient of Nusrat  ----- Message -----  From: Marzena Chase  Sent: 6/6/2025   4:02 PM CDT  To: [facility] Endocrinology Clinical Support Staff    Pt of RoblesPatito called stating the pharmacy told her she need refills on this medication cabergoline.Pt number  [phone] advise

## 2025-06-24 ENCOUNTER — TELEPHONE (OUTPATIENT)
Dept: GASTROENTEROLOGY | Facility: CLINIC | Age: 60
End: 2025-06-24
Payer: COMMERCIAL

## 2025-06-24 NOTE — TELEPHONE ENCOUNTER
Reached out to pt about her appointment tomorrow. She stated that when she saw Demetria in Oct. The wait time for the EGD was too long so she got scoped by Dr. Suresh. Pt now scheduled with Demetria again tomorrow. She stated she wants to see Demetria as her GI provider. Discussed with pt and informed her that now she is established with Kerwin she will need to come as a new patient back to clinic so we can obtain all the records and review the referral. Pt instructed to reach out to either [facility] or her PCP to get a referral to see Demetria. Pt verbalized understanding.     ----- Message from [employer] sent at 6/24/2025  8:20 AM CDT -----  Regarding: appointment tomorrow  Pt called to check time of appt tomorrow and I came across note that pt canceled procedure due to seeing Dr Suresh. Pt states that she last seen Kerwin in Feb last year and she doesn't want to see Mingo anymore. Not sure if those records need to be requested. Please advise

## 2025-07-02 DIAGNOSIS — E78.5 HYPERLIPIDEMIA, UNSPECIFIED HYPERLIPIDEMIA TYPE: ICD-10-CM

## 2025-07-06 RX ORDER — ATORVASTATIN CALCIUM 40 MG/1
40 TABLET, FILM COATED ORAL DAILY
Qty: 90 TABLET | Refills: 3 | Status: SHIPPED | OUTPATIENT
Start: 2025-07-06 | End: 2026-07-06

## 2025-07-17 ENCOUNTER — TELEPHONE (OUTPATIENT)
Dept: NEUROLOGY | Facility: CLINIC | Age: 60
End: 2025-07-17
Payer: COMMERCIAL

## 2025-07-21 ENCOUNTER — OFFICE VISIT (OUTPATIENT)
Dept: NEUROLOGY | Facility: CLINIC | Age: 60
End: 2025-07-21
Payer: COMMERCIAL

## 2025-07-21 VITALS
OXYGEN SATURATION: 98 % | SYSTOLIC BLOOD PRESSURE: 112 MMHG | WEIGHT: 169.81 LBS | HEART RATE: 90 BPM | TEMPERATURE: 99 F | RESPIRATION RATE: 16 BRPM | DIASTOLIC BLOOD PRESSURE: 79 MMHG | HEIGHT: 67 IN | BODY MASS INDEX: 26.65 KG/M2

## 2025-07-21 DIAGNOSIS — G43.709 CHRONIC MIGRAINE WITHOUT AURA WITHOUT STATUS MIGRAINOSUS, NOT INTRACTABLE: Primary | ICD-10-CM

## 2025-07-21 PROCEDURE — 99215 OFFICE O/P EST HI 40 MIN: CPT | Mod: PBBFAC | Performed by: NURSE PRACTITIONER

## 2025-07-21 PROCEDURE — 1159F MED LIST DOCD IN RCRD: CPT | Mod: CPTII,,, | Performed by: NURSE PRACTITIONER

## 2025-07-21 PROCEDURE — 3008F BODY MASS INDEX DOCD: CPT | Mod: CPTII,,, | Performed by: NURSE PRACTITIONER

## 2025-07-21 PROCEDURE — 1160F RVW MEDS BY RX/DR IN RCRD: CPT | Mod: CPTII,,, | Performed by: NURSE PRACTITIONER

## 2025-07-21 PROCEDURE — 3078F DIAST BP <80 MM HG: CPT | Mod: CPTII,,, | Performed by: NURSE PRACTITIONER

## 2025-07-21 PROCEDURE — 3074F SYST BP LT 130 MM HG: CPT | Mod: CPTII,,, | Performed by: NURSE PRACTITIONER

## 2025-07-21 PROCEDURE — 99214 OFFICE O/P EST MOD 30 MIN: CPT | Mod: S$PBB,,, | Performed by: NURSE PRACTITIONER

## 2025-07-21 RX ORDER — CLONAZEPAM 0.5 MG/1
1 TABLET ORAL 2 TIMES DAILY PRN
COMMUNITY
Start: 2025-07-07

## 2025-07-21 RX ORDER — TRAZODONE HYDROCHLORIDE 100 MG/1
100 TABLET ORAL NIGHTLY
COMMUNITY
Start: 2025-07-04

## 2025-07-21 RX ORDER — SUMATRIPTAN SUCCINATE 100 MG/1
100 TABLET ORAL 2 TIMES DAILY PRN
Qty: 12 TABLET | Refills: 3 | Status: SHIPPED | OUTPATIENT
Start: 2025-07-21 | End: 2026-07-21

## 2025-07-21 RX ORDER — CYCLOSPORINE 0.5 MG/ML
1 EMULSION OPHTHALMIC 2 TIMES DAILY
COMMUNITY
Start: 2025-07-14

## 2025-07-21 RX ORDER — OMEPRAZOLE 40 MG/1
40 CAPSULE, DELAYED RELEASE ORAL
COMMUNITY
Start: 2025-05-19

## 2025-07-21 RX ORDER — FLUOROMETHOLONE 1 MG/ML
SUSPENSION/ DROPS OPHTHALMIC
COMMUNITY
Start: 2025-04-17
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
## 2025-08-06 ENCOUNTER — HOSPITAL ENCOUNTER (OUTPATIENT)
Dept: RADIOLOGY | Facility: HOSPITAL | Age: 60
Discharge: HOME | End: 2025-08-06
Attending: NURSE PRACTITIONER
Payer: COMMERCIAL

## 2025-08-06 DIAGNOSIS — Z12.31 SCREENING MAMMOGRAM FOR BREAST CANCER: ICD-10-CM

## 2025-08-06 PROCEDURE — 77067 SCR MAMMO BI INCL CAD: CPT | Mod: TC

## 2025-08-06 PROCEDURE — 77063 BREAST TOMOSYNTHESIS BI: CPT | Mod: 26,,, | Performed by: RADIOLOGY

## 2025-08-06 PROCEDURE — 77067 SCR MAMMO BI INCL CAD: CPT | Mod: 26,,, | Performed by: RADIOLOGY
